# Patient Record
Sex: FEMALE | Race: BLACK OR AFRICAN AMERICAN | NOT HISPANIC OR LATINO | Employment: UNEMPLOYED | URBAN - METROPOLITAN AREA
[De-identification: names, ages, dates, MRNs, and addresses within clinical notes are randomized per-mention and may not be internally consistent; named-entity substitution may affect disease eponyms.]

---

## 2023-10-24 PROBLEM — E66.811 CLASS 1 OBESITY: Status: ACTIVE | Noted: 2023-10-24

## 2023-10-24 PROBLEM — E66.9 CLASS 1 OBESITY: Status: ACTIVE | Noted: 2023-10-24

## 2023-10-24 PROBLEM — O92.70 UNSPECIFIED DISORDERS OF LACTATION (HHS-HCC): Status: ACTIVE | Noted: 2023-10-24

## 2023-10-24 PROBLEM — L20.82 FLEXURAL ECZEMA: Status: ACTIVE | Noted: 2018-02-16

## 2023-10-24 PROBLEM — K21.9 GERD (GASTROESOPHAGEAL REFLUX DISEASE): Status: ACTIVE | Noted: 2023-10-24

## 2023-10-24 RX ORDER — IBUPROFEN 600 MG/1
600 TABLET ORAL EVERY 6 HOURS
COMMUNITY
Start: 2021-12-16 | End: 2023-10-27 | Stop reason: ALTCHOICE

## 2023-10-24 RX ORDER — TRIAMCINOLONE ACETONIDE 1 MG/G
1 CREAM TOPICAL
COMMUNITY
Start: 2018-02-16 | End: 2023-10-27 | Stop reason: ALTCHOICE

## 2023-10-24 RX ORDER — HYDROCORTISONE 25 MG/G
1 CREAM TOPICAL
COMMUNITY
Start: 2018-02-16 | End: 2023-10-27 | Stop reason: ALTCHOICE

## 2023-10-24 RX ORDER — ALBUTEROL SULFATE 90 UG/1
AEROSOL, METERED RESPIRATORY (INHALATION)
COMMUNITY
End: 2023-10-27 | Stop reason: ALTCHOICE

## 2023-10-24 RX ORDER — ACETAMINOPHEN 325 MG/1
TABLET ORAL
COMMUNITY
Start: 2021-12-16 | End: 2023-10-27 | Stop reason: ALTCHOICE

## 2023-10-24 RX ORDER — DOCUSATE SODIUM 100 MG/1
1 CAPSULE, LIQUID FILLED ORAL 2 TIMES DAILY PRN
COMMUNITY
Start: 2021-12-16 | End: 2023-10-27 | Stop reason: ALTCHOICE

## 2023-10-26 ENCOUNTER — APPOINTMENT (OUTPATIENT)
Dept: OBSTETRICS AND GYNECOLOGY | Facility: CLINIC | Age: 33
End: 2023-10-26
Payer: COMMERCIAL

## 2023-10-27 ENCOUNTER — INITIAL PRENATAL (OUTPATIENT)
Dept: OBSTETRICS AND GYNECOLOGY | Facility: CLINIC | Age: 33
End: 2023-10-27
Payer: COMMERCIAL

## 2023-10-27 VITALS
DIASTOLIC BLOOD PRESSURE: 80 MMHG | BODY MASS INDEX: 33.12 KG/M2 | WEIGHT: 198.8 LBS | SYSTOLIC BLOOD PRESSURE: 116 MMHG | HEIGHT: 65 IN

## 2023-10-27 DIAGNOSIS — Z3A.09 9 WEEKS GESTATION OF PREGNANCY (HHS-HCC): ICD-10-CM

## 2023-10-27 PROCEDURE — 87591 N.GONORRHOEAE DNA AMP PROB: CPT | Performed by: STUDENT IN AN ORGANIZED HEALTH CARE EDUCATION/TRAINING PROGRAM

## 2023-10-27 PROCEDURE — 87186 SC STD MICRODIL/AGAR DIL: CPT | Performed by: STUDENT IN AN ORGANIZED HEALTH CARE EDUCATION/TRAINING PROGRAM

## 2023-10-27 PROCEDURE — 87491 CHLMYD TRACH DNA AMP PROBE: CPT | Performed by: STUDENT IN AN ORGANIZED HEALTH CARE EDUCATION/TRAINING PROGRAM

## 2023-10-27 PROCEDURE — 99213 OFFICE O/P EST LOW 20 MIN: CPT | Mod: GC | Performed by: STUDENT IN AN ORGANIZED HEALTH CARE EDUCATION/TRAINING PROGRAM

## 2023-10-27 PROCEDURE — 99213 OFFICE O/P EST LOW 20 MIN: CPT | Performed by: STUDENT IN AN ORGANIZED HEALTH CARE EDUCATION/TRAINING PROGRAM

## 2023-10-27 ASSESSMENT — EDINBURGH POSTNATAL DEPRESSION SCALE (EPDS)
THINGS HAVE BEEN GETTING ON TOP OF ME: NO, I HAVE BEEN COPING AS WELL AS EVER
THE THOUGHT OF HARMING MYSELF HAS OCCURRED TO ME: NEVER
I HAVE LOOKED FORWARD WITH ENJOYMENT TO THINGS: AS MUCH AS I EVER DID
I HAVE BLAMED MYSELF UNNECESSARILY WHEN THINGS WENT WRONG: NO, NEVER
TOTAL SCORE: 0
I HAVE FELT SCARED OR PANICKY FOR NO GOOD REASON: NO, NOT AT ALL
I HAVE BEEN ABLE TO LAUGH AND SEE THE FUNNY SIDE OF THINGS: AS MUCH AS I ALWAYS COULD
I HAVE FELT SAD OR MISERABLE: NO, NOT AT ALL
I HAVE BEEN SO UNHAPPY THAT I HAVE BEEN CRYING: NO, NEVER
I HAVE BEEN ANXIOUS OR WORRIED FOR NO GOOD REASON: NO, NOT AT ALL
I HAVE BEEN SO UNHAPPY THAT I HAVE HAD DIFFICULTY SLEEPING: NOT AT ALL

## 2023-10-27 NOTE — LETTER
10/27/2023    To Whom It May Concern:    This is to certify that my patient,Jing Menjivar is under my care for her pregnancy.    The following guidelines may be used for any dental work:  You may use a local anesthetic with or without Epinephrine.  You may prescribe any Penicillin or Cephalosporin if an antibiotic is necessary. (Dependent on allergy history)  You may take x-rays with a lead apron if necessary.  You may prescribe Tylenol and/or narcotics for pain.  You may extract teeth if necessary.    If you need further information or if you have any concerns, please contact our office.    Sincerely,        Javier Liu MD   [facility] [place]

## 2023-10-27 NOTE — LETTER
10/27/2023    To Whom It May Concern:    iJng Menjivar is being followed for her pregnancy at [facility]. She is 9 weeks 0 days pregnant.  Estimated Date of Delivery: 5/31/24    Sincerely,        Javier Liu MD  [facility]

## 2023-10-27 NOTE — PROGRESS NOTES
Subjective   Patient ID [id number]   Jing Menjivar is a 32 y.o.  at 9w0d with a working estimated date of delivery of 2024, by Last Menstrual Period who presents for an initial prenatal visit. This pregnancy is unplanned and desired.    Her pregnancy is complicated by:  No known pregnancy complications    OB History    Para Term  AB Living   6 3 3 0 2 3   SAB IAB Ectopic Multiple Live Births     2 0 0 3      # Outcome Date GA Lbr Gustavo/2nd Weight Sex Delivery Anes PTL Lv   6 Current            5 Term            4 Term            3 Term            2 IAB            1 IAB              [place]  Depression Scale Total: 0    Objective   Physical Exam  Weight: 90.2 kg (198 lb 12.8 oz)  Expected Total Weight Gain: Could not be calculated   Pregravid BMI: Could not be calculated  BP: 116/80 (hr 73)          Physical Exam  HENT:      Head: Normocephalic.      Nose: Nose normal.   Eyes:      Extraocular Movements: Extraocular movements intact.      Conjunctiva/sclera: Conjunctivae normal.   Cardiovascular:      Comments: WWP  Pulmonary:      Effort: Pulmonary effort is normal.   Abdominal:      Palpations: Abdomen is soft.   Musculoskeletal:         General: Normal range of motion.      Cervical back: Normal range of motion.   Neurological:      Mental Status: She is alert.   Skin:     General: Skin is warm.   Vitals and nursing note reviewed. Exam conducted with a chaperone present.     BSUS with confirmed IUP and visible FHR.    Prenatal Labs  1st trimester lab panel sent as below/.    Assessment/Plan   Problem List Items Addressed This Visit    None  Visit Diagnoses         Codes    9 weeks gestation of pregnancy     Z3A.09    Relevant Orders    Hemoglobin    Hematocrit    ABO/Rh    Neisseria gonorrhoeae, Amplified    Chlamydia, PCR    Hepatitis B surface Ag    HIV-1 and HIV-2 antibodies    Urine culture    Urinalysis with Reflex Microscopic    POC Urine Dip    Rubella IgG    US MAC OB  imaging order    SMA Carrier Screening    CF carrier screen            Immunizations: None at today's visit  Prenatal Labs ordered  Daily prenatal vitamins prescribed  First trimester screening and second trimester screening discussed. Patient decided to Proceed with screening and labs today.   For first trimester US in 1-2 weeks for formal dating.  Follow up in 4 weeks for return OB visit.    D/w Dr. Kate Liu MD PGY-3

## 2023-10-28 LAB
C TRACH RRNA SPEC QL NAA+PROBE: NEGATIVE
N GONORRHOEA DNA SPEC QL PROBE+SIG AMP: NEGATIVE

## 2023-10-30 ENCOUNTER — TELEPHONE (OUTPATIENT)
Dept: OBSTETRICS AND GYNECOLOGY | Facility: HOSPITAL | Age: 33
End: 2023-10-30
Payer: COMMERCIAL

## 2023-10-30 DIAGNOSIS — R82.71 BACTERIURIA, ASYMPTOMATIC IN PREGNANCY (HHS-HCC): Primary | ICD-10-CM

## 2023-10-30 DIAGNOSIS — O99.891 BACTERIURIA, ASYMPTOMATIC IN PREGNANCY (HHS-HCC): Primary | ICD-10-CM

## 2023-10-30 LAB — BACTERIA UR CULT: ABNORMAL

## 2023-10-30 RX ORDER — AMOXICILLIN 875 MG/1
875 TABLET, FILM COATED ORAL EVERY 12 HOURS SCHEDULED
Status: SHIPPED | OUTPATIENT
Start: 2023-10-30 | End: 2023-11-04

## 2023-10-31 ENCOUNTER — ANCILLARY PROCEDURE (OUTPATIENT)
Dept: RADIOLOGY | Facility: CLINIC | Age: 33
End: 2023-10-31
Payer: COMMERCIAL

## 2023-10-31 DIAGNOSIS — Z3A.09 9 WEEKS GESTATION OF PREGNANCY (HHS-HCC): ICD-10-CM

## 2023-10-31 PROCEDURE — 76801 OB US < 14 WKS SINGLE FETUS: CPT

## 2023-10-31 PROCEDURE — 76801 OB US < 14 WKS SINGLE FETUS: CPT | Performed by: OBSTETRICS & GYNECOLOGY

## 2023-11-11 ENCOUNTER — APPOINTMENT (OUTPATIENT)
Dept: PRIMARY CARE | Facility: CLINIC | Age: 33
End: 2023-11-11
Payer: COMMERCIAL

## 2023-11-20 ENCOUNTER — SOCIAL WORK (OUTPATIENT)
Dept: PEDIATRICS | Facility: CLINIC | Age: 33
End: 2023-11-20
Payer: COMMERCIAL

## 2023-11-20 ENCOUNTER — ROUTINE PRENATAL (OUTPATIENT)
Dept: OBSTETRICS AND GYNECOLOGY | Facility: CLINIC | Age: 33
End: 2023-11-20
Payer: COMMERCIAL

## 2023-11-20 ENCOUNTER — PHARMACY VISIT (OUTPATIENT)
Dept: PHARMACY | Facility: CLINIC | Age: 33
End: 2023-11-20
Payer: MEDICAID

## 2023-11-20 ENCOUNTER — LAB (OUTPATIENT)
Dept: LAB | Facility: LAB | Age: 33
End: 2023-11-20
Payer: COMMERCIAL

## 2023-11-20 VITALS — SYSTOLIC BLOOD PRESSURE: 112 MMHG | DIASTOLIC BLOOD PRESSURE: 75 MMHG | BODY MASS INDEX: 32.42 KG/M2 | WEIGHT: 194.8 LBS

## 2023-11-20 DIAGNOSIS — O21.9 NAUSEA AND VOMITING IN PREGNANCY PRIOR TO 22 WEEKS GESTATION (HHS-HCC): Primary | ICD-10-CM

## 2023-11-20 DIAGNOSIS — O21.9 NAUSEA AND VOMITING IN PREGNANCY PRIOR TO 22 WEEKS GESTATION (HHS-HCC): ICD-10-CM

## 2023-11-20 DIAGNOSIS — F32.A DEPRESSION AFFECTING PREGNANCY IN FIRST TRIMESTER, ANTEPARTUM (HHS-HCC): ICD-10-CM

## 2023-11-20 DIAGNOSIS — O99.341 DEPRESSION AFFECTING PREGNANCY IN FIRST TRIMESTER, ANTEPARTUM (HHS-HCC): Primary | ICD-10-CM

## 2023-11-20 DIAGNOSIS — J30.2 SEASONAL ALLERGIES: ICD-10-CM

## 2023-11-20 DIAGNOSIS — O99.341 DEPRESSION AFFECTING PREGNANCY IN FIRST TRIMESTER, ANTEPARTUM (HHS-HCC): ICD-10-CM

## 2023-11-20 DIAGNOSIS — Z3A.09 9 WEEKS GESTATION OF PREGNANCY (HHS-HCC): ICD-10-CM

## 2023-11-20 DIAGNOSIS — O09.91 ENCOUNTER FOR SUPERVISION OF HIGH RISK PREGNANCY IN FIRST TRIMESTER, ANTEPARTUM (HHS-HCC): ICD-10-CM

## 2023-11-20 DIAGNOSIS — Z3A.12 12 WEEKS GESTATION OF PREGNANCY (HHS-HCC): ICD-10-CM

## 2023-11-20 DIAGNOSIS — F32.A DEPRESSION AFFECTING PREGNANCY IN FIRST TRIMESTER, ANTEPARTUM (HHS-HCC): Primary | ICD-10-CM

## 2023-11-20 LAB
ABO GROUP (TYPE) IN BLOOD: NORMAL
HBV SURFACE AG SERPL QL IA: NONREACTIVE
HCT VFR BLD AUTO: 38.7 % (ref 36–46)
HGB BLD-MCNC: 12.8 G/DL (ref 12–16)
HIV 1+2 AB+HIV1 P24 AG SERPL QL IA: NONREACTIVE
RH FACTOR (ANTIGEN D): NORMAL
RUBV IGG SERPL IA-ACNC: 2.9 IA
RUBV IGG SERPL QL IA: POSITIVE
TSH SERPL-ACNC: 0.7 MIU/L (ref 0.44–3.98)

## 2023-11-20 PROCEDURE — 86900 BLOOD TYPING SEROLOGIC ABO: CPT

## 2023-11-20 PROCEDURE — 86901 BLOOD TYPING SEROLOGIC RH(D): CPT

## 2023-11-20 PROCEDURE — 81329 SMN1 GENE DOS/DELETION ALYS: CPT

## 2023-11-20 PROCEDURE — 85014 HEMATOCRIT: CPT

## 2023-11-20 PROCEDURE — RXMED WILLOW AMBULATORY MEDICATION CHARGE

## 2023-11-20 PROCEDURE — 85018 HEMOGLOBIN: CPT

## 2023-11-20 PROCEDURE — 84443 ASSAY THYROID STIM HORMONE: CPT

## 2023-11-20 PROCEDURE — 87389 HIV-1 AG W/HIV-1&-2 AB AG IA: CPT

## 2023-11-20 PROCEDURE — 86317 IMMUNOASSAY INFECTIOUS AGENT: CPT

## 2023-11-20 PROCEDURE — 36415 COLL VENOUS BLD VENIPUNCTURE: CPT

## 2023-11-20 PROCEDURE — 99213 OFFICE O/P EST LOW 20 MIN: CPT

## 2023-11-20 PROCEDURE — 81220 CFTR GENE COM VARIANTS: CPT

## 2023-11-20 PROCEDURE — 87340 HEPATITIS B SURFACE AG IA: CPT

## 2023-11-20 PROCEDURE — 99213 OFFICE O/P EST LOW 20 MIN: CPT | Mod: GC,TH

## 2023-11-20 RX ORDER — PYRIDOXINE HCL (VITAMIN B6) 25 MG
25 TABLET ORAL NIGHTLY
Qty: 30 TABLET | Refills: 1 | Status: SHIPPED | OUTPATIENT
Start: 2023-11-20 | End: 2023-12-30

## 2023-11-20 RX ORDER — LORATADINE 10 MG
10 TABLET,DISINTEGRATING ORAL DAILY
Qty: 30 TABLET | Refills: 11 | Status: SHIPPED | OUTPATIENT
Start: 2023-11-20 | End: 2023-11-20 | Stop reason: ALTCHOICE

## 2023-11-20 RX ORDER — DOXYLAMINE SUCCINATE AND PYRIDOXINE HYDROCHLORIDE, DELAYED RELEASE TABLETS 10 MG/10 MG 10; 10 MG/1; MG/1
2 TABLET, DELAYED RELEASE ORAL NIGHTLY
Qty: 60 TABLET | Refills: 1 | Status: SHIPPED | OUTPATIENT
Start: 2023-11-20 | End: 2023-12-20

## 2023-11-20 RX ORDER — LORATADINE 10 MG/1
10 TABLET ORAL DAILY
Qty: 30 TABLET | Refills: 11 | Status: SHIPPED | OUTPATIENT
Start: 2023-11-20 | End: 2024-11-19

## 2023-11-20 ASSESSMENT — ENCOUNTER SYMPTOMS
CARDIOVASCULAR NEGATIVE: 0
PSYCHIATRIC NEGATIVE: 0
HEMATOLOGIC/LYMPHATIC NEGATIVE: 0
ENDOCRINE NEGATIVE: 0
NEUROLOGICAL NEGATIVE: 0
ALLERGIC/IMMUNOLOGIC NEGATIVE: 0
EYES NEGATIVE: 0
RESPIRATORY NEGATIVE: 0
GASTROINTESTINAL NEGATIVE: 0
MUSCULOSKELETAL NEGATIVE: 0
CONSTITUTIONAL NEGATIVE: 0

## 2023-11-20 NOTE — ASSESSMENT & PLAN NOTE
- diffuse enlarged thyroid on exam, TSH with reflex to T4 ordered  - Referral to Dr. Alvarez placed  - Social work saw patient today, referral for community therapy placed

## 2023-11-20 NOTE — PROGRESS NOTES
"Subjective   Patient ID [id number]   Jing Menjivar is a 33 y.o.  at 12w3d with a working estimated date of delivery of 2024, by Last Menstrual Period who presents for a routine prenatal visit. Pt c/o sadness, hoplesness and passive thoughts of harming self. Previously had depression in prior pregnancies and feels similar symptoms. Was on Wellbutrin and recently lost follow up with therapist.     Pt continues to have nausea.      She denies vaginal bleeding, leakage of fluid, and contractions.    Her pregnancy is complicated by:  Depression, previously on Wellbutrin prior to current pregnancy      Objective   Physical Exam  Weight: 88.4 kg (194 lb 12.8 oz), Pregravid BMI: Could not be calculated  Expected Total Weight Gain: Could not be calculated   BP: 112/75  Fetal Heart Rate: 152      General: alert and awake   HEENT: enlarged thyroid, non-tender   Abodmen: soft, gravid, non-tender     Prenatal Labs  Urine dip:  Lab Results   Component Value Date    KETONESU NEGATIVE 2020     Lab Results   Component Value Date    HGB 11.7 (L) 2023    HCT 35.9 (L) 2023    ABO AB 2023    HEPBSAG NONREACTIVE 2021     No results found for: \"PAPPA\", \"AFP\", \"HCG\", \"ESTRIOL\", \"INHBA\"    Imaging  The most recent ultrasound was performed on The most recent ultrasound study is not finalized with a study GA of The most recent ultrasound study is not finalized.  The most recent ultrasound study is not finalized  The most recent ultrasound study is not finalized    Assessment/Plan   Problem List Items Addressed This Visit       Depression affecting pregnancy in first trimester, antepartum - Primary    Overview     H/o depression in previous pregnancies, previously on Wellbutrin and followed with therapist          Current Assessment & Plan     - diffuse enlarged thyroid on exam, TSH with reflex to T4 ordered  - Referral to Dr. Antonio rodrigues  - Social work saw patient today, referral for community " therapy placed          Relevant Orders    Referral to Psychiatry    TSH with reflex to Free T4 if abnormal    Encounter for supervision of high risk pregnancy in first trimester, antepartum    Current Assessment & Plan     -PNLs to be collected today  -NT US scheduled for next visit            Relevant Orders    US OB NT (NUCHAL translucency)     Other Visit Diagnoses       Seasonal allergies        Relevant Medications    loratadine (Claritin RediTabs) 10 mg disintegrating tablet    Nausea and vomiting in pregnancy prior to 22 weeks gestation        Relevant Medications    doxylamine-pyridoxine, vit B6, 10-10 mg tablet,delayed release (DR/EC)          Follow up in 4 weeks for a routine prenatal visit.    D/w Dr. Breen,  Jane Starkey MD, PGY-2

## 2023-11-20 NOTE — PROGRESS NOTES
Social Work Note:     Jing Menjivar is a 33 y.o. female who presents for the following:     Patient Name:  Jing Menjivar  Medical Record Number:  [id number]  YOB: 1990    Date Seen:  11/20/2023    SW received referral from Women's health staff due to counseling referral for pt. SW met with pt, Jing Menjivar ([phone]), introduced self and explained reason for visit. Pt explained she has a history of depression and anxiety, and symptoms increase when she is pregnant, as noted in previous pregnancies. Pt shared she has noticed worsening depression and passive SI since becoming pregnant. Pt due 5/31/24. Pt lost contact with [facility] therapist a few months ago. Referral made by physician to Dr. Alvarez for psychiatry. SW discussed options for counseling referral and obtained verbal consent to refer pt to [facility]. Pt denied active SI/HI today. No plan. SW provided list of resources for baby supplies, food, WIC, and transportation assistance. No further SW needs at this time. SW contact info provided if needs arise.

## 2023-11-24 ENCOUNTER — APPOINTMENT (OUTPATIENT)
Dept: OBSTETRICS AND GYNECOLOGY | Facility: CLINIC | Age: 33
End: 2023-11-24
Payer: COMMERCIAL

## 2023-11-27 ENCOUNTER — APPOINTMENT (OUTPATIENT)
Dept: RADIOLOGY | Facility: CLINIC | Age: 33
End: 2023-11-27
Payer: COMMERCIAL

## 2023-11-30 LAB
ELECTRONICALLY SIGNED BY: NORMAL
SMA RESULT: NORMAL

## 2023-12-01 LAB
CFTR MUT ANL BLD/T: NORMAL
ELECTRONICALLY SIGNED BY: NORMAL

## 2023-12-08 ENCOUNTER — PROCEDURE VISIT (OUTPATIENT)
Dept: RADIOLOGY | Facility: CLINIC | Age: 33
End: 2023-12-08
Payer: COMMERCIAL

## 2023-12-08 DIAGNOSIS — O09.91 ENCOUNTER FOR SUPERVISION OF HIGH RISK PREGNANCY IN FIRST TRIMESTER, ANTEPARTUM (HHS-HCC): ICD-10-CM

## 2023-12-08 PROCEDURE — 76815 OB US LIMITED FETUS(S): CPT | Performed by: OBSTETRICS & GYNECOLOGY

## 2023-12-08 PROCEDURE — 76815 OB US LIMITED FETUS(S): CPT

## 2023-12-14 NOTE — PROGRESS NOTES
I saw and evaluated the patient. I personally obtained the key and critical portions of the history and physical exam or was physically present for key and critical portions performed by the resident/fellow. I reviewed the resident/fellow's documentation and discussed the patient with the resident/fellow. I agree with the resident/fellow's medical decision making as documented in the note.    Carie Love MD

## 2024-02-09 ENCOUNTER — INITIAL PRENATAL (OUTPATIENT)
Dept: OBSTETRICS AND GYNECOLOGY | Facility: CLINIC | Age: 34
End: 2024-02-09
Payer: COMMERCIAL

## 2024-02-09 VITALS — DIASTOLIC BLOOD PRESSURE: 73 MMHG | BODY MASS INDEX: 34.05 KG/M2 | SYSTOLIC BLOOD PRESSURE: 115 MMHG | WEIGHT: 204.6 LBS

## 2024-02-09 DIAGNOSIS — R51.9 PREGNANCY HEADACHE IN SECOND TRIMESTER (HHS-HCC): ICD-10-CM

## 2024-02-09 DIAGNOSIS — O99.342 DEPRESSION AFFECTING PREGNANCY IN SECOND TRIMESTER, ANTEPARTUM (HHS-HCC): ICD-10-CM

## 2024-02-09 DIAGNOSIS — F32.A DEPRESSION AFFECTING PREGNANCY IN SECOND TRIMESTER, ANTEPARTUM (HHS-HCC): ICD-10-CM

## 2024-02-09 DIAGNOSIS — O26.892 PREGNANCY HEADACHE IN SECOND TRIMESTER (HHS-HCC): ICD-10-CM

## 2024-02-09 DIAGNOSIS — Z3A.24 24 WEEKS GESTATION OF PREGNANCY (HHS-HCC): Primary | ICD-10-CM

## 2024-02-09 PROBLEM — J45.40 MODERATE PERSISTENT ASTHMA WITHOUT COMPLICATION (HHS-HCC): Status: ACTIVE | Noted: 2024-02-09

## 2024-02-09 PROCEDURE — RXMED WILLOW AMBULATORY MEDICATION CHARGE

## 2024-02-09 PROCEDURE — 99214 OFFICE O/P EST MOD 30 MIN: CPT | Performed by: ADVANCED PRACTICE MIDWIFE

## 2024-02-09 RX ORDER — SERTRALINE HYDROCHLORIDE 25 MG/1
50 TABLET, FILM COATED ORAL DAILY
Qty: 60 TABLET | Refills: 3 | Status: SHIPPED | OUTPATIENT
Start: 2024-02-09 | End: 2024-03-15

## 2024-02-09 RX ORDER — METOCLOPRAMIDE 10 MG/1
10 TABLET ORAL EVERY 8 HOURS PRN
Qty: 14 TABLET | Refills: 1 | Status: SHIPPED | OUTPATIENT
Start: 2024-02-09 | End: 2024-05-03

## 2024-02-09 NOTE — PROGRESS NOTES
Assessment/Plan   Problem List Items Addressed This Visit             ICD-10-CM    Depression affecting pregnancy in second trimester, antepartum O99.342, F32.A    Relevant Medications    sertraline (Zoloft) 25 mg tablet     Other Visit Diagnoses         Codes    24 weeks gestation of pregnancy    -  Primary Z3A.24    Relevant Medications    metoclopramide (Reglan) 10 mg tablet    sertraline (Zoloft) 25 mg tablet    Other Relevant Orders    Urine Culture    US MAC OB imaging order    Hemoglobin Identification with Path Review    Pregnancy headache in second trimester     O26.892, R51.9    Relevant Medications    metoclopramide (Reglan) 10 mg tablet            Labs reviewed.  Discussed diabetes screening and routine labs, to be completed next visit  Benefits of breastfeeding discussed with patient, breast pump ordered   IOP referral  Discussed headaches with patient-   SW to come talk today to patient for depression in preg    Reviewed s/sx of PTL, warning signs, fetal movement counts, and when to call provider  Follow up in 2 weeks for a routine prenatal visit.    MAGNOLIA Oneillamar Menjivar is a 33 y.o.  at 24w0d with a working estimated date of delivery of 2024, by Last Menstrual Period who presents for a routine prenatal visit. She denies vaginal bleeding, leakage of fluid, decreased fetal movements, or contractions.    Her pregnancy is complicated by:  Pregnancy Problems (from 10/27/23 to present)       Problem Noted Resolved    Moderate persistent asthma without complication 2024 by MAGNOLIA Oneil No    Priority:  Medium      Encounter for supervision of high risk pregnancy in first trimester, antepartum 2023 by Jane Starkey MD No    Priority:  Medium      Depression affecting pregnancy in first trimester, antepartum 2023 by Jane Starkey MD No    Priority:  Medium      Overview Addendum 2023 11:18 AM by  Jane Starkey MD     H/o depression in previous pregnancies, previously on Wellbutrin and followed with therapist                   Objective   Physical Exam  Weight: 92.8 kg (204 lb 9.6 oz)  Expected Total Weight Gain: 5 kg (11 lb)-9 kg (19 lb)   Pregravid BMI: 31.62  BP: 115/73 (HR 76)  Fetal Heart Rate: 148 Fundal Height (cm): 24 cm    Postpartum Depression: Low Risk  (10/27/2023)    [place]  Depression Scale     Last EPDS Total Score: 0     Last EPDS Self Harm Result: Never       Prenatal Labs  Lab Results   Component Value Date    HGB 12.8 2023    HCT 38.7 2023    ABO AB 2023    HEPBSAG Nonreactive 2023     Glucose, 1 Hr Screen, Preg   Date Value Ref Range Status   2021 109 <135 mg/dL Final     Comment:      Diagnostic value with glucose loading dose of 50 g.   Reference values from American Diabetes Association.   Diabetes Care 2015;38(Suppl.1):S8-S16          Imaging  The most recent ultrasound was performed on The most recent ultrasound study is not finalized with a study GA of The most recent ultrasound study is not finalized and EFW of The most recent ultrasound study is not finalized.  The most recent ultrasound study is not finalized  The most recent ultrasound study is not finalized

## 2024-02-14 ENCOUNTER — PHARMACY VISIT (OUTPATIENT)
Dept: PHARMACY | Facility: CLINIC | Age: 34
End: 2024-02-14
Payer: MEDICAID

## 2024-02-15 ENCOUNTER — HOSPITAL ENCOUNTER (OUTPATIENT)
Dept: RADIOLOGY | Facility: CLINIC | Age: 34
Discharge: HOME | End: 2024-02-15
Payer: COMMERCIAL

## 2024-02-15 DIAGNOSIS — Z3A.24 24 WEEKS GESTATION OF PREGNANCY (HHS-HCC): ICD-10-CM

## 2024-02-15 PROCEDURE — 76811 OB US DETAILED SNGL FETUS: CPT

## 2024-02-15 PROCEDURE — 76811 OB US DETAILED SNGL FETUS: CPT | Performed by: MEDICAL GENETICS

## 2024-02-22 PROBLEM — R68.89 SMALL HEAD CIRCUMFERENCE: Status: ACTIVE | Noted: 2024-02-22

## 2024-03-07 ENCOUNTER — HOSPITAL ENCOUNTER (OUTPATIENT)
Dept: RADIOLOGY | Facility: CLINIC | Age: 34
Discharge: HOME | End: 2024-03-07
Payer: COMMERCIAL

## 2024-03-07 DIAGNOSIS — Z3A.24 24 WEEKS GESTATION OF PREGNANCY (HHS-HCC): ICD-10-CM

## 2024-03-07 PROCEDURE — 76816 OB US FOLLOW-UP PER FETUS: CPT

## 2024-03-07 PROCEDURE — 76816 OB US FOLLOW-UP PER FETUS: CPT | Performed by: OBSTETRICS & GYNECOLOGY

## 2024-04-24 ENCOUNTER — HOSPITAL ENCOUNTER (OUTPATIENT)
Facility: HOSPITAL | Age: 34
Discharge: HOME | End: 2024-04-25
Attending: STUDENT IN AN ORGANIZED HEALTH CARE EDUCATION/TRAINING PROGRAM | Admitting: STUDENT IN AN ORGANIZED HEALTH CARE EDUCATION/TRAINING PROGRAM
Payer: COMMERCIAL
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
## 2024-04-24 DIAGNOSIS — R10.2 PELVIC PAIN AFFECTING PREGNANCY IN THIRD TRIMESTER, ANTEPARTUM (HHS-HCC): ICD-10-CM

## 2024-04-24 DIAGNOSIS — O26.893 PELVIC PAIN AFFECTING PREGNANCY IN THIRD TRIMESTER, ANTEPARTUM (HHS-HCC): ICD-10-CM

## 2024-04-24 LAB
ALBUMIN SERPL BCP-MCNC: 3.2 G/DL (ref 3.4–5)
ALP SERPL-CCNC: 83 U/L (ref 33–110)
ALT SERPL W P-5'-P-CCNC: 8 U/L (ref 7–45)
ANION GAP SERPL CALC-SCNC: 13 MMOL/L (ref 10–20)
APTT PPP: 26 SECONDS (ref 27–38)
AST SERPL W P-5'-P-CCNC: 9 U/L (ref 9–39)
BILIRUB SERPL-MCNC: 0.2 MG/DL (ref 0–1.2)
BUN SERPL-MCNC: 6 MG/DL (ref 6–23)
CALCIUM SERPL-MCNC: 8.5 MG/DL (ref 8.6–10.6)
CHLORIDE SERPL-SCNC: 105 MMOL/L (ref 98–107)
CO2 SERPL-SCNC: 21 MMOL/L (ref 21–32)
CREAT SERPL-MCNC: 0.41 MG/DL (ref 0.5–1.05)
EGFRCR SERPLBLD CKD-EPI 2021: >90 ML/MIN/1.73M*2
ERYTHROCYTE [DISTWIDTH] IN BLOOD BY AUTOMATED COUNT: 15.5 % (ref 11.5–14.5)
FIBRINOGEN PPP-MCNC: 425 MG/DL (ref 200–400)
GLUCOSE BLD MANUAL STRIP-MCNC: 112 MG/DL (ref 74–99)
GLUCOSE SERPL-MCNC: 91 MG/DL (ref 74–99)
HCT VFR BLD AUTO: 30.1 % (ref 36–46)
HGB BLD-MCNC: 9.5 G/DL (ref 12–16)
INR PPP: 0.9 (ref 0.9–1.1)
MCH RBC QN AUTO: 25.3 PG (ref 26–34)
MCHC RBC AUTO-ENTMCNC: 31.6 G/DL (ref 32–36)
MCV RBC AUTO: 80 FL (ref 80–100)
NRBC BLD-RTO: 0 /100 WBCS (ref 0–0)
PLATELET # BLD AUTO: 227 X10*3/UL (ref 150–450)
POTASSIUM SERPL-SCNC: 3.7 MMOL/L (ref 3.5–5.3)
PROT SERPL-MCNC: 6.5 G/DL (ref 6.4–8.2)
PROTHROMBIN TIME: 10.5 SECONDS (ref 9.8–12.8)
RBC # BLD AUTO: 3.76 X10*6/UL (ref 4–5.2)
SODIUM SERPL-SCNC: 135 MMOL/L (ref 136–145)
WBC # BLD AUTO: 15.8 X10*3/UL (ref 4.4–11.3)

## 2024-04-24 PROCEDURE — 83036 HEMOGLOBIN GLYCOSYLATED A1C: CPT | Performed by: OBSTETRICS & GYNECOLOGY

## 2024-04-24 PROCEDURE — 86780 TREPONEMA PALLIDUM: CPT | Performed by: OBSTETRICS & GYNECOLOGY

## 2024-04-24 PROCEDURE — 2500000001 HC RX 250 WO HCPCS SELF ADMINISTERED DRUGS (ALT 637 FOR MEDICARE OP): Performed by: STUDENT IN AN ORGANIZED HEALTH CARE EDUCATION/TRAINING PROGRAM

## 2024-04-24 PROCEDURE — 99214 OFFICE O/P EST MOD 30 MIN: CPT | Performed by: STUDENT IN AN ORGANIZED HEALTH CARE EDUCATION/TRAINING PROGRAM

## 2024-04-24 PROCEDURE — 85610 PROTHROMBIN TIME: CPT | Performed by: STUDENT IN AN ORGANIZED HEALTH CARE EDUCATION/TRAINING PROGRAM

## 2024-04-24 PROCEDURE — 80053 COMPREHEN METABOLIC PANEL: CPT | Performed by: STUDENT IN AN ORGANIZED HEALTH CARE EDUCATION/TRAINING PROGRAM

## 2024-04-24 PROCEDURE — 82728 ASSAY OF FERRITIN: CPT | Performed by: STUDENT IN AN ORGANIZED HEALTH CARE EDUCATION/TRAINING PROGRAM

## 2024-04-24 PROCEDURE — 85384 FIBRINOGEN ACTIVITY: CPT | Performed by: STUDENT IN AN ORGANIZED HEALTH CARE EDUCATION/TRAINING PROGRAM

## 2024-04-24 PROCEDURE — 82947 ASSAY GLUCOSE BLOOD QUANT: CPT

## 2024-04-24 PROCEDURE — 36415 COLL VENOUS BLD VENIPUNCTURE: CPT | Performed by: STUDENT IN AN ORGANIZED HEALTH CARE EDUCATION/TRAINING PROGRAM

## 2024-04-24 PROCEDURE — 83540 ASSAY OF IRON: CPT | Performed by: STUDENT IN AN ORGANIZED HEALTH CARE EDUCATION/TRAINING PROGRAM

## 2024-04-24 PROCEDURE — 59025 FETAL NON-STRESS TEST: CPT | Performed by: STUDENT IN AN ORGANIZED HEALTH CARE EDUCATION/TRAINING PROGRAM

## 2024-04-24 PROCEDURE — 86803 HEPATITIS C AB TEST: CPT | Performed by: OBSTETRICS & GYNECOLOGY

## 2024-04-24 PROCEDURE — 85027 COMPLETE CBC AUTOMATED: CPT | Performed by: STUDENT IN AN ORGANIZED HEALTH CARE EDUCATION/TRAINING PROGRAM

## 2024-04-24 RX ORDER — ACETAMINOPHEN 325 MG/1
975 TABLET ORAL ONCE
Status: COMPLETED | OUTPATIENT
Start: 2024-04-24 | End: 2024-04-24

## 2024-04-24 RX ORDER — LABETALOL HYDROCHLORIDE 5 MG/ML
20 INJECTION, SOLUTION INTRAVENOUS ONCE AS NEEDED
Status: DISCONTINUED | OUTPATIENT
Start: 2024-04-24 | End: 2024-04-25 | Stop reason: HOSPADM

## 2024-04-24 RX ORDER — ONDANSETRON HYDROCHLORIDE 2 MG/ML
4 INJECTION, SOLUTION INTRAVENOUS EVERY 6 HOURS PRN
Status: DISCONTINUED | OUTPATIENT
Start: 2024-04-24 | End: 2024-04-25 | Stop reason: HOSPADM

## 2024-04-24 RX ORDER — NIFEDIPINE 10 MG/1
10 CAPSULE ORAL ONCE AS NEEDED
Status: DISCONTINUED | OUTPATIENT
Start: 2024-04-24 | End: 2024-04-25 | Stop reason: HOSPADM

## 2024-04-24 RX ORDER — HYDRALAZINE HYDROCHLORIDE 20 MG/ML
5 INJECTION INTRAMUSCULAR; INTRAVENOUS ONCE AS NEEDED
Status: DISCONTINUED | OUTPATIENT
Start: 2024-04-24 | End: 2024-04-25 | Stop reason: HOSPADM

## 2024-04-24 RX ORDER — LIDOCAINE HYDROCHLORIDE 10 MG/ML
0.5 INJECTION INFILTRATION; PERINEURAL ONCE AS NEEDED
Status: DISCONTINUED | OUTPATIENT
Start: 2024-04-24 | End: 2024-04-25 | Stop reason: HOSPADM

## 2024-04-24 RX ORDER — ONDANSETRON 4 MG/1
4 TABLET, FILM COATED ORAL EVERY 6 HOURS PRN
Status: DISCONTINUED | OUTPATIENT
Start: 2024-04-24 | End: 2024-04-25 | Stop reason: HOSPADM

## 2024-04-24 RX ORDER — CYCLOBENZAPRINE HCL 10 MG
10 TABLET ORAL ONCE
Status: COMPLETED | OUTPATIENT
Start: 2024-04-24 | End: 2024-04-24

## 2024-04-24 RX ORDER — OXYCODONE HYDROCHLORIDE 5 MG/1
10 TABLET ORAL ONCE
Status: COMPLETED | OUTPATIENT
Start: 2024-04-24 | End: 2024-04-24

## 2024-04-24 RX ADMIN — ACETAMINOPHEN 975 MG: 325 TABLET ORAL at 20:45

## 2024-04-24 RX ADMIN — CYCLOBENZAPRINE HYDROCHLORIDE 10 MG: 10 TABLET, FILM COATED ORAL at 20:45

## 2024-04-24 RX ADMIN — OXYCODONE HYDROCHLORIDE 10 MG: 5 TABLET ORAL at 23:16

## 2024-04-24 SDOH — SOCIAL STABILITY: SOCIAL INSECURITY: ARE YOU OR HAVE YOU BEEN THREATENED OR ABUSED PHYSICALLY, EMOTIONALLY, OR SEXUALLY BY ANYONE?: NO

## 2024-04-24 SDOH — SOCIAL STABILITY: SOCIAL INSECURITY: HAS ANYONE EVER THREATENED TO HURT YOUR FAMILY OR YOUR PETS?: NO

## 2024-04-24 SDOH — SOCIAL STABILITY: SOCIAL INSECURITY: DO YOU FEEL ANYONE HAS EXPLOITED OR TAKEN ADVANTAGE OF YOU FINANCIALLY OR OF YOUR PERSONAL PROPERTY?: NO

## 2024-04-24 SDOH — SOCIAL STABILITY: SOCIAL INSECURITY: HAVE YOU HAD ANY THOUGHTS OF HARMING ANYONE ELSE?: NO

## 2024-04-24 SDOH — HEALTH STABILITY: MENTAL HEALTH: WERE YOU ABLE TO COMPLETE ALL THE BEHAVIORAL HEALTH SCREENINGS?: YES

## 2024-04-24 SDOH — ECONOMIC STABILITY: HOUSING INSECURITY: DO YOU FEEL UNSAFE GOING BACK TO THE PLACE WHERE YOU ARE LIVING?: NO

## 2024-04-24 SDOH — HEALTH STABILITY: MENTAL HEALTH: SUICIDAL BEHAVIOR (LIFETIME): NO

## 2024-04-24 SDOH — SOCIAL STABILITY: SOCIAL INSECURITY: VERBAL ABUSE: DENIES

## 2024-04-24 SDOH — SOCIAL STABILITY: SOCIAL INSECURITY: ABUSE SCREEN: ADULT

## 2024-04-24 SDOH — SOCIAL STABILITY: SOCIAL INSECURITY: PHYSICAL ABUSE: DENIES

## 2024-04-24 SDOH — SOCIAL STABILITY: SOCIAL INSECURITY: HAVE YOU HAD THOUGHTS OF HARMING ANYONE ELSE?: NO

## 2024-04-24 SDOH — HEALTH STABILITY: MENTAL HEALTH: NON-SPECIFIC ACTIVE SUICIDAL THOUGHTS (PAST 1 MONTH): NO

## 2024-04-24 SDOH — SOCIAL STABILITY: SOCIAL INSECURITY: DOES ANYONE TRY TO KEEP YOU FROM HAVING/CONTACTING OTHER FRIENDS OR DOING THINGS OUTSIDE YOUR HOME?: NO

## 2024-04-24 SDOH — HEALTH STABILITY: MENTAL HEALTH: STRENGTHS (MUST CHOOSE TWO): SUPPORT FROM ORGANIZED COMMUNITY;STABLE HOUSING;SUPPORT FROM FRIENDS

## 2024-04-24 SDOH — SOCIAL STABILITY: SOCIAL INSECURITY: ARE THERE ANY APPARENT SIGNS OF INJURIES/BEHAVIORS THAT COULD BE RELATED TO ABUSE/NEGLECT?: NO

## 2024-04-24 SDOH — HEALTH STABILITY: MENTAL HEALTH: HAVE YOU USED ANY PRESCRIPTION DRUGS OTHER THAN PRESCRIBED IN THE PAST 12 MONTHS?: NO

## 2024-04-24 SDOH — HEALTH STABILITY: MENTAL HEALTH: HAVE YOU USED ANY SUBSTANCES (CANABIS, COCAINE, HEROIN, HALLUCINOGENS, INHALANTS, ETC.) IN THE PAST 12 MONTHS?: NO

## 2024-04-24 SDOH — HEALTH STABILITY: MENTAL HEALTH: WISH TO BE DEAD (PAST 1 MONTH): NO

## 2024-04-24 ASSESSMENT — LIFESTYLE VARIABLES
AUDIT-C TOTAL SCORE: 0
HOW OFTEN DO YOU HAVE 6 OR MORE DRINKS ON ONE OCCASION: NEVER
HOW OFTEN DO YOU HAVE A DRINK CONTAINING ALCOHOL: NEVER
HOW MANY STANDARD DRINKS CONTAINING ALCOHOL DO YOU HAVE ON A TYPICAL DAY: PATIENT DOES NOT DRINK
AUDIT-C TOTAL SCORE: 0
SKIP TO QUESTIONS 9-10: 1

## 2024-04-24 ASSESSMENT — PATIENT HEALTH QUESTIONNAIRE - PHQ9
SUM OF ALL RESPONSES TO PHQ9 QUESTIONS 1 & 2: 0
1. LITTLE INTEREST OR PLEASURE IN DOING THINGS: NOT AT ALL
2. FEELING DOWN, DEPRESSED OR HOPELESS: NOT AT ALL

## 2024-04-24 ASSESSMENT — PAIN SCALES - GENERAL
PAINLEVEL_OUTOF10: 10 - WORST POSSIBLE PAIN
PAINLEVEL: 10 - WORST POSSIBLE PAIN
PAINLEVEL_OUTOF10: 7
PAINLEVEL_OUTOF10: 8

## 2024-04-24 ASSESSMENT — PAIN - FUNCTIONAL ASSESSMENT: PAIN_FUNCTIONAL_ASSESSMENT: 0-10

## 2024-04-25 ENCOUNTER — APPOINTMENT (OUTPATIENT)
Dept: RADIOLOGY | Facility: HOSPITAL | Age: 34
End: 2024-04-25
Payer: COMMERCIAL

## 2024-04-25 ENCOUNTER — TELEPHONE (OUTPATIENT)
Dept: OBSTETRICS AND GYNECOLOGY | Facility: CLINIC | Age: 34
End: 2024-04-25
Payer: COMMERCIAL

## 2024-04-25 VITALS
OXYGEN SATURATION: 97 % | SYSTOLIC BLOOD PRESSURE: 126 MMHG | HEART RATE: 75 BPM | TEMPERATURE: 98.8 F | DIASTOLIC BLOOD PRESSURE: 75 MMHG | RESPIRATION RATE: 16 BRPM

## 2024-04-25 LAB
EST. AVERAGE GLUCOSE BLD GHB EST-MCNC: 120 MG/DL
FERRITIN SERPL-MCNC: 16 NG/ML
HBA1C MFR BLD: 5.8 %
HCV AB SER QL: NONREACTIVE
IRON SATN MFR SERPL: 9 % (ref 25–45)
IRON SERPL-MCNC: 46 UG/DL (ref 35–150)
TIBC SERPL-MCNC: 490 UG/DL (ref 240–445)
TREPONEMA PALLIDUM IGG+IGM AB [PRESENCE] IN SERUM OR PLASMA BY IMMUNOASSAY: NONREACTIVE
UIBC SERPL-MCNC: 444 UG/DL (ref 110–370)

## 2024-04-25 PROCEDURE — 99215 OFFICE O/P EST HI 40 MIN: CPT

## 2024-04-25 PROCEDURE — 72170 X-RAY EXAM OF PELVIS: CPT

## 2024-04-25 PROCEDURE — 72170 X-RAY EXAM OF PELVIS: CPT | Performed by: STUDENT IN AN ORGANIZED HEALTH CARE EDUCATION/TRAINING PROGRAM

## 2024-04-25 PROCEDURE — RXMED WILLOW AMBULATORY MEDICATION CHARGE

## 2024-04-25 RX ORDER — OXYCODONE HYDROCHLORIDE 5 MG/1
5 TABLET ORAL EVERY 6 HOURS PRN
Qty: 4 TABLET | Refills: 0 | Status: SHIPPED | OUTPATIENT
Start: 2024-04-25 | End: 2024-04-27

## 2024-04-25 RX ORDER — FERROUS SULFATE 325(65) MG
65 TABLET ORAL
Qty: 30 TABLET | Refills: 1 | Status: SHIPPED | OUTPATIENT
Start: 2024-04-25 | End: 2024-06-24

## 2024-04-25 ASSESSMENT — PAIN SCALES - GENERAL: PAINLEVEL_OUTOF10: 7

## 2024-04-25 NOTE — H&P
Obstetrical Triage History and Physical     Jing Menjivar is a 33 y.o.  at 34w6d by LMP c/w 9w4d US who presents to triage s/p fall with associated pelvic and hip pain.    Chief Complaint: Loss of Consciousness (Fall today 23 around 1800. Patient denies abdominal trauma.)    Assessment/Plan    S/p Fall  - abdomen soft  - Coags, Fibrinogen WNL  - low suspicion for abruption    Pelvic Pain  - c/o 10/10 pelvic and hip pain after fall   - SVE: unable to assess cervix, very posterior  - toco: irregular ctxs/irritability   - low suspicion for PTL   - pelvic XR without evidence of pelvic or hip fracture, showing separation of pubic symphysis  - pt able to ambulate to bathroom  - BLE neurovascularly intact   - given Flexeril 10 mg, Tylenol 975 mg for pain -> pain still 10/10 -> given oxycodone 10 mg -> pain now 5/10   - short course of oxycodone 5 mg sent to pharmacy, encouraged use of Tylenol prn and belly band for support, consider chiropractic care at [facility]     Syncope  - EKG NSR   - pt declines orthostatics   - CBC significant for anemia with hgb 9.5, o/w CBC and CMP WNL  - suspect due to physiologic changes of pregnancy +/- symptomatic anemia     Anemia  - hgb 9.5  - ferritin 16  - PO Fe sent at DC     Maternal Well-being  - Vital signs stable and WNL  - Emotional support and reassurance provided  - All questions and concerns addressed    IUP @ 34w6d  - prenatal lab work reviewed  - needs 1 hr GTT  - last growth 3/7 EFW 1063g 21%, AC 23%  - NST reactive  - continue routine prenatal care    Dispo:   - appropriate for discharge to home, patient comfortable with this   - follow-up with CNM on  for routine prenatal visit   - return precautions reviewed     Pt and tracing seen and discussed with Dr. Abreu who agrees with plan    Brianna Shannon PA-C  24 1:32 AM  Vocera    Active Problems:  There are no active Hospital Problems.      Pregnancy Problems (from 10/27/23 to present)        Problem Noted Resolved    Small head circumference 2024 by MAGNOLIA Oneil No    Priority:  Medium      Overview Signed 2024  1:15 PM by MAGNOLIA Oneil     Repeat US in 3wk from previous  Patient offered genetic testing- declined   Will do repeat US eval          Moderate persistent asthma without complication ([facility]) 2024 by MAGNOLIA Oneil No    Priority:  Medium      Depression affecting pregnancy in second trimester, antepartum ([facility]) 2024 by MAGNOLIA Oneil No    Priority:  Medium      Encounter for supervision of high risk pregnancy in first trimester, antepartum ([facility]) 2023 by Jane Starkey MD No    Priority:  Medium      Depression affecting pregnancy in first trimester, antepartum ([facility]) 2023 by Jane Starkey MD No    Priority:  Medium      Overview Addendum 2024  3:35 PM by MAGNOLIA Oneil     H/o depression in previous pregnancies, previously on Wellbutrin and followed with therapist     Was previously on risperidone with last pregnancy- says happens every pregnancy and gets worse postpartum               Carmen Ch is a 33 y.o.  at 34w6d by LMP c/w 9w4d US who presents to triage s/p fall with associated pelvic and hip pain. Per partner at bedside just prior to arrival pt had a syncopal episode. She was standing when she suddenly fell on her bottom onto the ground. He states that she did not hit her belly or her head. Per partner she seemed to lose consciousness for a couple of seconds but came to quickly. Pt states she felt hot and dizzy prior to episode. Denies chest pain, palpitations, or SOB at time of episode. States this has happened throughout the pregnancy but she is usually able to sit down and symptoms resolved. Since the fall she is c/o severe 10/10 hip and pelvic pain and states she is unable to walk or move her legs. Denies bowel or bladder  incontinence. Denies VB, LOF, or ctxs. Reports good FM.    Obstetrical History   OB History    Para Term  AB Living   6 3 3 0 2 3   SAB IAB Ectopic Multiple Live Births     2 0 0 3      # Outcome Date GA Lbr Gustavo/2nd Weight Sex Delivery Anes PTL Lv   6 Current            5 Term            4 Term            3 Term            2 IAB            1 IAB                Past Medical History  Past Medical History:   Diagnosis Date    31 weeks gestation of pregnancy ([facility]) 10/21/2021    31 weeks gestation of pregnancy    Encounter for  screening, unspecified ([facility]) 2021    Encounter for fetal ultrasound    Encounter for immunization 2018    Need for Tdap vaccination    Encounter for pregnancy test, result positive ([facility]) 2014    Positive pregnancy test    Encounter for supervision of normal first pregnancy, unspecified trimester ([facility]) 2015    Pregnancy, first    Personal history of diseases of the skin and subcutaneous tissue     History of eczema    Personal history of other diseases of the respiratory system 2014    History of asthma    Personal history of other drug therapy 10/21/2021    History of influenza vaccination        Past Surgical History   Past Surgical History:   Procedure Laterality Date    OTHER SURGICAL HISTORY  2021    Dilation and curettage    OTHER SURGICAL HISTORY  2015    Prior Surgical Procedure Not Done       Social History  Social History     Tobacco Use    Smoking status: Never    Smokeless tobacco: Never   Substance Use Topics    Alcohol use: Not Currently     Substance and Sexual Activity   Drug Use Never       Allergies  Patient has no known allergies.     Medications  Medications Prior to Admission   Medication Sig Dispense Refill Last Dose    doxylamine (Unisom) 25 mg tablet Take 1 tablet (25 mg) by mouth as needed at bedtime for sleep. 30 tablet 0     loratadine (Claritin) 10 mg tablet Take 1 tablet (10 mg) by mouth  once daily. 30 tablet 11     metoclopramide (Reglan) 10 mg tablet Take 1 tablet (10 mg) by mouth every 8 hours if needed (headaches) for up to 10 days. 14 tablet 1     sertraline (Zoloft) 25 mg tablet Take 2 tablets (50 mg) by mouth once daily. 60 tablet 3        Objective    Last Vitals  Temp Pulse Resp BP MAP O2 Sat   37.1 °C (98.8 °F) 76 16 126/63   97 %     Physical Examination  GENERAL: Examination reveals a well developed, well nourished, gravid female in no acute distress. She is alert and cooperative and sleeping comfortably in room .  LUNGS: Normal respiratory effort  ABDOMEN:  soft, gravid, mild TTP over lower pelvis and pubic symphysis  CERVIX:  unable to assess, very posterior ; MEMBRANES are Intact  EXTREMITIES: no redness or tenderness in the calves or thighs, no edema, intact peripheral pulses  NEUROLOGICAL: alert, oriented, normal speech, no focal findings or movement disorder noted, BLE with normal sensation and strength , able to ambulate and moves legs spontaneously in bed  PSYCHOLOGICAL: awake and alert; oriented to person, place, and time    Non-Stress Test   Baseline Fetal Heart Rate for Non-Stress Test: 135 BPM  Variability in Waveform for Non-Stress Test: Moderate  Accelerations in Non-Stress Test: Yes, lasting at least 15 seconds, greater than/equal to 15 bpm  Decelerations in Non-Stress Test: None  Contractions in Non-Stress Test: Irregular  Interpretation of Non-Stress Test   Interpretation of Non-Stress Test: Reactive    Lab Review  Lab Results   Component Value Date    WBC 15.8 (H) 04/24/2024    HGB 9.5 (L) 04/24/2024    HCT 30.1 (L) 04/24/2024     04/24/2024     Lab Results   Component Value Date    GLUCOSE 91 04/24/2024     (L) 04/24/2024    K 3.7 04/24/2024     04/24/2024    CO2 21 04/24/2024    ANIONGAP 13 04/24/2024    BUN 6 04/24/2024    CREATININE 0.41 (L) 04/24/2024    EGFR >90 04/24/2024    CALCIUM 8.5 (L) 04/24/2024    ALBUMIN 3.2 (L) 04/24/2024    PROT 6.5  04/24/2024    ALKPHOS 83 04/24/2024    ALT 8 04/24/2024    AST 9 04/24/2024    BILITOT 0.2 04/24/2024     Lab Results   Component Value Date    APTT 26 (L) 04/24/2024    PROTIME 10.5 04/24/2024    INR 0.9 04/24/2024     Lab Results   Component Value Date    FIBRINOGEN 425 (H) 04/24/2024

## 2024-04-25 NOTE — TELEPHONE ENCOUNTER
Pt called and scheduled for dr Winslow tomorrow. Did not cancel apt for cnm on 5/7    ----- Message from Katlin Abreu MD sent at 4/25/2024  6:56 AM EDT -----  Regarding: Appointment  Hi!     Jing needs to be seen as soon as possible at [facility] she has not been seen in many months. Can we get her an appointment ASAP?    Katlin Abreu MD

## 2024-04-26 ENCOUNTER — ROUTINE PRENATAL (OUTPATIENT)
Dept: OBSTETRICS AND GYNECOLOGY | Facility: CLINIC | Age: 34
End: 2024-04-26
Payer: COMMERCIAL

## 2024-04-26 ENCOUNTER — PHARMACY VISIT (OUTPATIENT)
Dept: PHARMACY | Facility: CLINIC | Age: 34
End: 2024-04-26
Payer: MEDICAID

## 2024-04-26 VITALS — BODY MASS INDEX: 35.51 KG/M2 | SYSTOLIC BLOOD PRESSURE: 117 MMHG | WEIGHT: 213.4 LBS | DIASTOLIC BLOOD PRESSURE: 72 MMHG

## 2024-04-26 DIAGNOSIS — O99.013 ANEMIA AFFECTING PREGNANCY IN THIRD TRIMESTER (HHS-HCC): ICD-10-CM

## 2024-04-26 DIAGNOSIS — Z3A.35 35 WEEKS GESTATION OF PREGNANCY (HHS-HCC): ICD-10-CM

## 2024-04-26 DIAGNOSIS — O36.5930: ICD-10-CM

## 2024-04-26 DIAGNOSIS — R10.2 PELVIC PAIN: Primary | ICD-10-CM

## 2024-04-26 DIAGNOSIS — O99.013 ANEMIA DURING PREGNANCY IN THIRD TRIMESTER (HHS-HCC): ICD-10-CM

## 2024-04-26 PROBLEM — O92.70 UNSPECIFIED DISORDERS OF LACTATION (HHS-HCC): Status: RESOLVED | Noted: 2023-10-24 | Resolved: 2024-04-26

## 2024-04-26 PROBLEM — F32.A DEPRESSION AFFECTING PREGNANCY IN SECOND TRIMESTER, ANTEPARTUM (HHS-HCC): Status: RESOLVED | Noted: 2024-02-09 | Resolved: 2024-04-26

## 2024-04-26 PROBLEM — O99.342 DEPRESSION AFFECTING PREGNANCY IN SECOND TRIMESTER, ANTEPARTUM (HHS-HCC): Status: RESOLVED | Noted: 2024-02-09 | Resolved: 2024-04-26

## 2024-04-26 PROBLEM — L20.82 FLEXURAL ECZEMA: Status: RESOLVED | Noted: 2018-02-16 | Resolved: 2024-04-26

## 2024-04-26 PROBLEM — K21.9 GERD (GASTROESOPHAGEAL REFLUX DISEASE): Status: RESOLVED | Noted: 2023-10-24 | Resolved: 2024-04-26

## 2024-04-26 PROCEDURE — 99213 OFFICE O/P EST LOW 20 MIN: CPT | Mod: GC,TH | Performed by: STUDENT IN AN ORGANIZED HEALTH CARE EDUCATION/TRAINING PROGRAM

## 2024-04-26 PROCEDURE — 99213 OFFICE O/P EST LOW 20 MIN: CPT | Performed by: STUDENT IN AN ORGANIZED HEALTH CARE EDUCATION/TRAINING PROGRAM

## 2024-04-26 PROCEDURE — 90715 TDAP VACCINE 7 YRS/> IM: CPT | Mod: GC | Performed by: STUDENT IN AN ORGANIZED HEALTH CARE EDUCATION/TRAINING PROGRAM

## 2024-04-26 RX ORDER — FAMOTIDINE 10 MG/ML
20 INJECTION INTRAVENOUS ONCE AS NEEDED
OUTPATIENT
Start: 2024-04-26

## 2024-04-26 RX ORDER — EPINEPHRINE 0.3 MG/.3ML
0.3 INJECTION SUBCUTANEOUS EVERY 5 MIN PRN
OUTPATIENT
Start: 2024-04-26

## 2024-04-26 RX ORDER — ALBUTEROL SULFATE 0.83 MG/ML
3 SOLUTION RESPIRATORY (INHALATION) AS NEEDED
OUTPATIENT
Start: 2024-04-26

## 2024-04-26 RX ORDER — FERROUS GLUCONATE 324(38)MG
38 TABLET ORAL
Qty: 30 TABLET | Refills: 11 | Status: SHIPPED | OUTPATIENT
Start: 2024-04-26 | End: 2025-04-26

## 2024-04-26 RX ORDER — DIPHENHYDRAMINE HYDROCHLORIDE 50 MG/ML
50 INJECTION INTRAMUSCULAR; INTRAVENOUS AS NEEDED
OUTPATIENT
Start: 2024-04-26

## 2024-04-26 ASSESSMENT — ENCOUNTER SYMPTOMS
ALLERGIC/IMMUNOLOGIC NEGATIVE: 0
CONSTITUTIONAL NEGATIVE: 0
CARDIOVASCULAR NEGATIVE: 0
EYES NEGATIVE: 0
RESPIRATORY NEGATIVE: 0
ENDOCRINE NEGATIVE: 0
NEUROLOGICAL NEGATIVE: 0
MUSCULOSKELETAL NEGATIVE: 0
GASTROINTESTINAL NEGATIVE: 0
HEMATOLOGIC/LYMPHATIC NEGATIVE: 0
PSYCHIATRIC NEGATIVE: 0

## 2024-04-26 NOTE — ASSESSMENT & PLAN NOTE
1-hr GTT pending, discussed completing it as soon as able. Patient unable to complete today due to childcare, order placed to complete at later date.  Breast pump requested, plans to breast/bottle feed  Desires ppIUD   Tdap given today

## 2024-04-26 NOTE — PROGRESS NOTES
Subjective   Patient ID [id number]   Jing Menjivar is a 33 y.o.  at 35w0d with a working estimated date of delivery of 2024, by Last Menstrual Period who presents for a routine prenatal visit. She denies vaginal bleeding, leakage of fluid, decreased fetal movements.    Two days ago pt fainted, fell (denies abdominal trauma), and presented to OB Triage at [facility]. At that time, pt stated she had 10/10 right hip pain. Pelvic x-ray was negative for fracture. Since then, the hip pain has improved slightly to 7/10 although she continues to have difficulty walking.     Pt has had intermittent contractions, but states they do not feel like labor.       Objective   Physical Exam:   Weight: 96.8 kg (213 lb 6.4 oz)  Expected Total Weight Gain: 5 kg (11 lb)-9 kg (19 lb)   Pregravid BMI: 31.62  BP: 117/72 (Pluse-79)  Fetal Heart Rate: 126 Fundal Height (cm): 32 cm           Fundal height: 32  Fetal heart tones: 126    Prenatal Labs  Urine Dip:  Lab Results   Component Value Date    KETONESU NEGATIVE 2020     Lab Results   Component Value Date    HGB 9.5 (L) 2024    HCT 30.1 (L) 2024    ABO AB 2023    HEPBSAG Nonreactive 2023     Imaging: has not had ultrasound since follow-up anatomy ultrasound on 3/7/24, which demonstrated normal interval growth and  no abnormalities.    Assessment/Plan   Problem List Items Addressed This Visit             ICD-10-CM    Encounter for maternal care for suspected poor fetal growth in valadez pregnancy in third trimester ([facility]) O36.5930     S<D on    Growth US ordered          Anemia during pregnancy in third trimester ([facility]) O99.013     Hgb 9.5 , PO Fe sent    Fe deficiency anemia          35 weeks gestation of pregnancy ([facility]) Z3A.35     1-hr GTT pending, discussed completing it as soon as able. Patient unable to complete today due to childcare, order placed to complete at later date.  Breast pump requested, plans to  breast/bottle feed  Desires ppIUD   Tdap given today          Relevant Orders    Glucose, 1 Hour Screen, Pregnancy    US MAC OB imaging order     Other Visit Diagnoses         Codes    Pelvic pain    -  Primary R10.2    Relevant Orders    Referral to Physical Therapy    Anemia affecting pregnancy in third trimester ([facility])     O99.013    Relevant Medications    ferrous gluconate 324 (38 Fe) mg tablet          Continue prenatal vitamin.  Prescribed PO iron supplementation.  Need 1-hour glucose test  Received Tdap today.  Scheduled fetal growth ultrasound in the setting of S<D  Expected mode of delivery is induced vaginal delivery  Pelvic floor PT info given   Follow up in 1 week for a routine prenatal visit.    Patient seen and discussed with attending physician.    Siomara Bonilla  MS-3, Obstetrics and Gynecology    Patient seen and examined w medical student, edits made in text. Seen and dw Dr Kate Winslow MD  OB/GYN PGY3

## 2024-05-03 ENCOUNTER — ROUTINE PRENATAL (OUTPATIENT)
Dept: OBSTETRICS AND GYNECOLOGY | Facility: CLINIC | Age: 34
End: 2024-05-03
Payer: COMMERCIAL

## 2024-05-03 ENCOUNTER — HOSPITAL ENCOUNTER (OUTPATIENT)
Dept: RADIOLOGY | Facility: CLINIC | Age: 34
Discharge: HOME | End: 2024-05-03
Payer: COMMERCIAL

## 2024-05-03 VITALS — DIASTOLIC BLOOD PRESSURE: 77 MMHG | SYSTOLIC BLOOD PRESSURE: 120 MMHG | BODY MASS INDEX: 35.69 KG/M2 | WEIGHT: 214.5 LBS

## 2024-05-03 DIAGNOSIS — O36.5930: ICD-10-CM

## 2024-05-03 DIAGNOSIS — J45.40 MODERATE PERSISTENT ASTHMA WITHOUT COMPLICATION (HHS-HCC): ICD-10-CM

## 2024-05-03 DIAGNOSIS — O99.013 ANEMIA DURING PREGNANCY IN THIRD TRIMESTER (HHS-HCC): ICD-10-CM

## 2024-05-03 DIAGNOSIS — Z3A.36 36 WEEKS GESTATION OF PREGNANCY (HHS-HCC): ICD-10-CM

## 2024-05-03 DIAGNOSIS — F32.A DEPRESSION AFFECTING PREGNANCY IN FIRST TRIMESTER, ANTEPARTUM (HHS-HCC): ICD-10-CM

## 2024-05-03 DIAGNOSIS — Z3A.24 24 WEEKS GESTATION OF PREGNANCY (HHS-HCC): ICD-10-CM

## 2024-05-03 DIAGNOSIS — O99.341 DEPRESSION AFFECTING PREGNANCY IN FIRST TRIMESTER, ANTEPARTUM (HHS-HCC): ICD-10-CM

## 2024-05-03 DIAGNOSIS — R68.89 SMALL HEAD CIRCUMFERENCE: ICD-10-CM

## 2024-05-03 PROCEDURE — 87081 CULTURE SCREEN ONLY: CPT

## 2024-05-03 PROCEDURE — 76819 FETAL BIOPHYS PROFIL W/O NST: CPT

## 2024-05-03 PROCEDURE — 76816 OB US FOLLOW-UP PER FETUS: CPT | Performed by: OBSTETRICS & GYNECOLOGY

## 2024-05-03 PROCEDURE — 99213 OFFICE O/P EST LOW 20 MIN: CPT

## 2024-05-03 PROCEDURE — 99213 OFFICE O/P EST LOW 20 MIN: CPT | Mod: GC,TH

## 2024-05-03 PROCEDURE — 76819 FETAL BIOPHYS PROFIL W/O NST: CPT | Performed by: OBSTETRICS & GYNECOLOGY

## 2024-05-03 PROCEDURE — 76816 OB US FOLLOW-UP PER FETUS: CPT

## 2024-05-03 ASSESSMENT — ENCOUNTER SYMPTOMS
PSYCHIATRIC NEGATIVE: 0
NEUROLOGICAL NEGATIVE: 0
GASTROINTESTINAL NEGATIVE: 0
EYES NEGATIVE: 0
CARDIOVASCULAR NEGATIVE: 0
HEMATOLOGIC/LYMPHATIC NEGATIVE: 0
MUSCULOSKELETAL NEGATIVE: 0
CONSTITUTIONAL NEGATIVE: 0
ALLERGIC/IMMUNOLOGIC NEGATIVE: 0
RESPIRATORY NEGATIVE: 0
ENDOCRINE NEGATIVE: 0

## 2024-05-03 ASSESSMENT — EDINBURGH POSTNATAL DEPRESSION SCALE (EPDS)
I HAVE BEEN ABLE TO LAUGH AND SEE THE FUNNY SIDE OF THINGS: AS MUCH AS I ALWAYS COULD
THINGS HAVE BEEN GETTING ON TOP OF ME: NO, I HAVE BEEN COPING AS WELL AS EVER
THE THOUGHT OF HARMING MYSELF HAS OCCURRED TO ME: NEVER
I HAVE BEEN ANXIOUS OR WORRIED FOR NO GOOD REASON: NO, NOT AT ALL
I HAVE LOOKED FORWARD WITH ENJOYMENT TO THINGS: AS MUCH AS I EVER DID
I HAVE BEEN SO UNHAPPY THAT I HAVE HAD DIFFICULTY SLEEPING: NOT AT ALL
I HAVE BEEN SO UNHAPPY THAT I HAVE BEEN CRYING: NO, NEVER
TOTAL SCORE: 3
I HAVE BLAMED MYSELF UNNECESSARILY WHEN THINGS WENT WRONG: NO, NEVER
I HAVE FELT SAD OR MISERABLE: YES, MOST OF THE TIME
I HAVE FELT SCARED OR PANICKY FOR NO GOOD REASON: NO, NOT AT ALL

## 2024-05-03 NOTE — PROGRESS NOTES
Subjective   Patient ID [id number]   Jing Menjivar is a 33 y.o.  at 36w0d with a working estimated date of delivery of 2024, by Last Menstrual Period who presents for a routine prenatal visit. She denies vaginal bleeding, leakage of fluid, decreased fetal movements, or contractions. Patient has no complaints today.      Objective   Physical Exam:   Weight: 97.3 kg (214 lb 8 oz)  Expected Total Weight Gain: 5 kg (11 lb)-9 kg (19 lb)   Pregravid BMI: 31.62  BP: 120/77  Fetal Heart Rate: us               Prenatal Labs  Urine Dip:  Lab Results   Component Value Date    KETONESU NEGATIVE 2020     Lab Results   Component Value Date    HGB 9.5 (L) 2024    HCT 30.1 (L) 2024    ABO AB 2023    HEPBSAG Nonreactive 2023     Imaging  The most recent ultrasound was performed on The most recent ultrasound study is not finalized with a study GA of The most recent ultrasound study is not finalized and EFW of The most recent ultrasound study is not finalized.  The most recent ultrasound study is not finalized  The most recent ultrasound study is not finalized    Assessment/Plan   Problem List Items Addressed This Visit       36 weeks gestation of pregnancy ([facility])    Overview     Dating: LMP cw 9w us   [x] Initial BMI: 35  [] Prenatal Labs:   [] Genetic Screening:    [] Baby ASA:  [] Anatomy US:  [] 1hr GCT at 24-28wks: Drawn 5/3   [x] Tdap (27-36wks):   [] Flu Shot:  [] COVID vaccine:   [] Rhogam (if Rh neg):   [x] GBS at 36 wks: collected 5/3  [x] Breastfeeding pump requested   [x] Postpartum Birth control method: ppIUD  [x] 39 weeks discussion of IOL vs. Expectant management: desires 39w IOL, requested date   [x] Mode of delivery:  anticipate vaginal          Relevant Orders    Group B Streptococcus (GBS) Prenatal Screen, Culture    Anemia during pregnancy in third trimester ([facility])    Overview     Hgb 9.5 , PO Fe sent . Scheduled for IV iron infusion on .          Depression affecting pregnancy in first trimester, antepartum ([facility])    Overview     H/o depression in previous pregnancies, previously on Wellbutrin and followed with therapist     Was previously on risperidone with last pregnancy- says happens every pregnancy and gets worse postpartum         Current Assessment & Plan     Mood is good today.         Encounter for maternal care for suspected poor fetal growth in valadez pregnancy in third trimester ([facility])    Overview     S<D on 4/26  Growth US 5/3 - normal growth pattern         Moderate persistent asthma without complication ([facility])    Overview     No current inhaler use         Small head circumference    Overview     Patient offered genetic testing- declined   Repeat US eval 5/3 - wnl           Follow up in 1 week for a routine prenatal visit.    Seen and discussed w/ Dr. Yang Crenshaw MD PGY-1  Obstetrics & Gynecology

## 2024-05-05 LAB — GP B STREP GENITAL QL CULT: NORMAL

## 2024-05-10 ENCOUNTER — TELEPHONE (OUTPATIENT)
Dept: OBSTETRICS AND GYNECOLOGY | Facility: HOSPITAL | Age: 34
End: 2024-05-10

## 2024-05-10 ENCOUNTER — TELEPHONE (OUTPATIENT)
Dept: OBSTETRICS AND GYNECOLOGY | Facility: CLINIC | Age: 34
End: 2024-05-10
Payer: COMMERCIAL

## 2024-05-10 DIAGNOSIS — Z3A.39 39 WEEKS GESTATION OF PREGNANCY (HHS-HCC): Primary | ICD-10-CM

## 2024-05-10 NOTE — TELEPHONE ENCOUNTER
Induction of labor per Dr Winslow at 39 weeks GA  Scheduled for 5/24/24  Arrive at 1700  Patient notified  Visitor Policy reviewed.     Missed last OBFU, rescheduled for 5/14

## 2024-05-10 NOTE — TELEPHONE ENCOUNTER
----- Message from Lillie Winslow MD sent at 4/26/2024  1:31 PM EDT -----  Hi!     This patient would like to be set up with a breat pump. She also is 35w today and would like to be scheduled for an induction of labor at 39w     Thank you!  Lillie

## 2024-05-14 ENCOUNTER — ROUTINE PRENATAL (OUTPATIENT)
Dept: OBSTETRICS AND GYNECOLOGY | Facility: CLINIC | Age: 34
End: 2024-05-14
Payer: COMMERCIAL

## 2024-05-14 VITALS — SYSTOLIC BLOOD PRESSURE: 104 MMHG | BODY MASS INDEX: 36.19 KG/M2 | WEIGHT: 217.5 LBS | DIASTOLIC BLOOD PRESSURE: 69 MMHG

## 2024-05-14 DIAGNOSIS — O09.33 LIMITED PRENATAL CARE IN THIRD TRIMESTER (HHS-HCC): ICD-10-CM

## 2024-05-14 DIAGNOSIS — O09.93 SUPERVISION OF HIGH RISK PREGNANCY IN THIRD TRIMESTER (HHS-HCC): Primary | ICD-10-CM

## 2024-05-14 DIAGNOSIS — F32.A DEPRESSION AFFECTING PREGNANCY IN FIRST TRIMESTER, ANTEPARTUM (HHS-HCC): ICD-10-CM

## 2024-05-14 DIAGNOSIS — O99.341 DEPRESSION AFFECTING PREGNANCY IN FIRST TRIMESTER, ANTEPARTUM (HHS-HCC): ICD-10-CM

## 2024-05-14 DIAGNOSIS — O99.013 ANEMIA DURING PREGNANCY IN THIRD TRIMESTER (HHS-HCC): ICD-10-CM

## 2024-05-14 DIAGNOSIS — Z3A.37 37 WEEKS GESTATION OF PREGNANCY (HHS-HCC): ICD-10-CM

## 2024-05-14 DIAGNOSIS — O36.5930: ICD-10-CM

## 2024-05-14 DIAGNOSIS — J45.40 MODERATE PERSISTENT ASTHMA WITHOUT COMPLICATION (HHS-HCC): ICD-10-CM

## 2024-05-14 PROCEDURE — 99214 OFFICE O/P EST MOD 30 MIN: CPT | Mod: GC,TH

## 2024-05-14 PROCEDURE — 99214 OFFICE O/P EST MOD 30 MIN: CPT

## 2024-05-14 ASSESSMENT — ENCOUNTER SYMPTOMS
MUSCULOSKELETAL NEGATIVE: 0
PSYCHIATRIC NEGATIVE: 0
CARDIOVASCULAR NEGATIVE: 0
GASTROINTESTINAL NEGATIVE: 0
ENDOCRINE NEGATIVE: 0
RESPIRATORY NEGATIVE: 0
HEMATOLOGIC/LYMPHATIC NEGATIVE: 0
ALLERGIC/IMMUNOLOGIC NEGATIVE: 0
CONSTITUTIONAL NEGATIVE: 0
NEUROLOGICAL NEGATIVE: 0
EYES NEGATIVE: 0

## 2024-05-14 NOTE — ASSESSMENT & PLAN NOTE
Up to date - except 1 hr. Discussed likely BG checks in labor  Labor precautions discussed. IOL scheduled on 5/24

## 2024-05-14 NOTE — PROGRESS NOTES
Ob Follow-up  24     SUBJECTIVE      HPI: Jing Menjivar is a 33 y.o.  at 37w4d here for RPNV.  She has intermittent  contractions, no bleeding, or no LOF. Reports normal fetal movement.        OBJECTIVE  Visit Vitals  /69   Wt 98.7 kg (217 lb 8 oz)   LMP 2023 (Exact Date)   BMI 36.19 kg/m²   OB Status Pregnant   Smoking Status Never   BSA 2.13 m²            ASSESSMENT & PLAN    Jing Menjivar is a 33 y.o.  at 37w4d here for the following concerns we addressed today:    Problem List Items Addressed This Visit          Pregnancy    Supervision of high risk pregnancy in third trimester ([facility]) - Primary    Moderate persistent asthma without complication ([facility])    Overview     No current inhaler use         Current Assessment & Plan     No inhaler use         Limited prenatal care in third trimester ([facility])    Overview     Only 4 prenatal visits         Encounter for maternal care for suspected poor fetal growth in valadez pregnancy in third trimester ([facility])    Overview     S<D on   Growth US 5/3 - normal growth pattern         Current Assessment & Plan     S < D. Normal growth 5/3         Depression affecting pregnancy in first trimester, antepartum ([facility])    Overview     H/o depression in previous pregnancies, previously on Wellbutrin and followed with therapist     Was previously on risperidone with last pregnancy- says happens every pregnancy and gets worse postpartum         Current Assessment & Plan     No current mood issues. Postpartum precautions discussed         Anemia during pregnancy in third trimester ([facility])    Overview     Hgb 9.5 , PO Fe sent . Scheduled for IV iron infusion on .         Current Assessment & Plan     Was scheduled for IV iron infusion however missed appointment. Discussed taking PO iron every other night until delivery. Patient accepts blood products if needed.         37 weeks gestation of pregnancy ([facility])     Overview     Dating: LMP cw 9w us   [x] Initial BMI: 35  [x] Prenatal Labs:   [x] Genetic Screening:    [] Baby ASA:  [x] Anatomy US:  [] 1hr GCT at 24-28wks: drank 5/3 - blood not drawn  [x] Tdap (27-36wks): 4/26  [] Flu Shot: declined  [] COVID vaccine: declined  [x] Rhogam (if Rh neg): n/a  [x] GBS at 36 wks: collected 5/3  [x] Breastfeeding pump requested 4/26  [x] Postpartum Birth control method: ppIUD  [x] 39 weeks discussion of IOL vs. Expectant management: desires 39w IOL, requested date   [x] Mode of delivery:  anticipate vaginal          Current Assessment & Plan     Up to date - except 1 hr. Discussed likely BG checks in labor  Labor precautions discussed. IOL scheduled on 5/24            OBGYN Infusion    37 weeks gestation of pregnancy ([facility])    Overview     Dating: LMP cw 9w us   [x] Initial BMI: 35  [x] Prenatal Labs:   [x] Genetic Screening:    [] Baby ASA:  [x] Anatomy US:  [] 1hr GCT at 24-28wks: drank 5/3 - blood not drawn  [x] Tdap (27-36wks): 4/26  [] Flu Shot: declined  [] COVID vaccine: declined  [x] Rhogam (if Rh neg): n/a  [x] GBS at 36 wks: collected 5/3  [x] Breastfeeding pump requested 4/26  [x] Postpartum Birth control method: ppIUD  [x] 39 weeks discussion of IOL vs. Expectant management: desires 39w IOL, requested date   [x] Mode of delivery:  anticipate vaginal          Current Assessment & Plan     Up to date - except 1 hr. Discussed likely BG checks in labor  Labor precautions discussed. IOL scheduled on 5/24              No orders of the defined types were placed in this encounter.       RTC in 1 week  Seen and discussed with Dr. Xavi Cast MD

## 2024-05-14 NOTE — PROGRESS NOTES
I saw and examined the patient.  I personally obtained the key and critical portions of the history and physical exam, and was physically present for the key and critical portions performed by the resident.  I reviewed the resident's documentation and discussed the patient with the resident.  I agreed with the resident's medical decision-making as documented in the resident's note.  Corinne Bazella MD

## 2024-05-14 NOTE — ASSESSMENT & PLAN NOTE
Was scheduled for IV iron infusion however missed appointment. Discussed taking PO iron every other night until delivery. Patient accepts blood products if needed.

## 2024-05-24 ENCOUNTER — ANESTHESIA EVENT (OUTPATIENT)
Dept: OBSTETRICS AND GYNECOLOGY | Facility: HOSPITAL | Age: 34
End: 2024-05-24
Payer: COMMERCIAL

## 2024-05-24 ENCOUNTER — HOSPITAL ENCOUNTER (INPATIENT)
Facility: HOSPITAL | Age: 34
LOS: 2 days | Discharge: HOME | End: 2024-05-26
Attending: OBSTETRICS & GYNECOLOGY | Admitting: STUDENT IN AN ORGANIZED HEALTH CARE EDUCATION/TRAINING PROGRAM
Payer: COMMERCIAL

## 2024-05-24 ENCOUNTER — ANESTHESIA (OUTPATIENT)
Dept: OBSTETRICS AND GYNECOLOGY | Facility: HOSPITAL | Age: 34
End: 2024-05-24
Payer: COMMERCIAL

## 2024-05-24 ENCOUNTER — HOSPITAL ENCOUNTER (INPATIENT)
Facility: HOSPITAL | Age: 34
LOS: 1 days | Discharge: HOME | End: 2024-05-24
Attending: OBSTETRICS & GYNECOLOGY | Admitting: OBSTETRICS & GYNECOLOGY
Payer: COMMERCIAL

## 2024-05-24 ENCOUNTER — APPOINTMENT (OUTPATIENT)
Dept: OBSTETRICS AND GYNECOLOGY | Facility: HOSPITAL | Age: 34
End: 2024-05-24
Payer: COMMERCIAL

## 2024-05-24 DIAGNOSIS — Z3A.39 39 WEEKS GESTATION OF PREGNANCY (HHS-HCC): ICD-10-CM

## 2024-05-24 LAB
ABO GROUP (TYPE) IN BLOOD: NORMAL
ANTIBODY SCREEN: NORMAL
ERYTHROCYTE [DISTWIDTH] IN BLOOD BY AUTOMATED COUNT: 16.7 % (ref 11.5–14.5)
GLUCOSE BLD MANUAL STRIP-MCNC: 101 MG/DL (ref 74–99)
GLUCOSE BLD MANUAL STRIP-MCNC: 94 MG/DL (ref 74–99)
HCT VFR BLD AUTO: 31.2 % (ref 36–46)
HGB BLD-MCNC: 9.8 G/DL (ref 12–16)
MCH RBC QN AUTO: 25.4 PG (ref 26–34)
MCHC RBC AUTO-ENTMCNC: 31.4 G/DL (ref 32–36)
MCV RBC AUTO: 81 FL (ref 80–100)
NRBC BLD-RTO: 0 /100 WBCS (ref 0–0)
PLATELET # BLD AUTO: 219 X10*3/UL (ref 150–450)
RBC # BLD AUTO: 3.86 X10*6/UL (ref 4–5.2)
RH FACTOR (ANTIGEN D): NORMAL
TREPONEMA PALLIDUM IGG+IGM AB [PRESENCE] IN SERUM OR PLASMA BY IMMUNOASSAY: NONREACTIVE
WBC # BLD AUTO: 13.5 X10*3/UL (ref 4.4–11.3)

## 2024-05-24 PROCEDURE — 1120000001 HC OB PRIVATE ROOM DAILY

## 2024-05-24 PROCEDURE — 85027 COMPLETE CBC AUTOMATED: CPT | Performed by: STUDENT IN AN ORGANIZED HEALTH CARE EDUCATION/TRAINING PROGRAM

## 2024-05-24 PROCEDURE — 58300 INSERT INTRAUTERINE DEVICE: CPT | Performed by: STUDENT IN AN ORGANIZED HEALTH CARE EDUCATION/TRAINING PROGRAM

## 2024-05-24 PROCEDURE — 2500000004 HC RX 250 GENERAL PHARMACY W/ HCPCS (ALT 636 FOR OP/ED): Performed by: STUDENT IN AN ORGANIZED HEALTH CARE EDUCATION/TRAINING PROGRAM

## 2024-05-24 PROCEDURE — 3700000001 HC GENERAL ANESTHESIA TIME - INITIAL BASE CHARGE: Performed by: ANESTHESIOLOGY

## 2024-05-24 PROCEDURE — 86923 COMPATIBILITY TEST ELECTRIC: CPT

## 2024-05-24 PROCEDURE — 2500000004 HC RX 250 GENERAL PHARMACY W/ HCPCS (ALT 636 FOR OP/ED)

## 2024-05-24 PROCEDURE — 82947 ASSAY GLUCOSE BLOOD QUANT: CPT

## 2024-05-24 PROCEDURE — 3700000014 EPIDURAL BLOCK: Mod: GC

## 2024-05-24 PROCEDURE — 7210000002 HC LABOR PER HOUR

## 2024-05-24 PROCEDURE — 2500000005 HC RX 250 GENERAL PHARMACY W/O HCPCS

## 2024-05-24 PROCEDURE — 36415 COLL VENOUS BLD VENIPUNCTURE: CPT | Performed by: STUDENT IN AN ORGANIZED HEALTH CARE EDUCATION/TRAINING PROGRAM

## 2024-05-24 PROCEDURE — 3E033VJ INTRODUCTION OF OTHER HORMONE INTO PERIPHERAL VEIN, PERCUTANEOUS APPROACH: ICD-10-PCS | Performed by: NURSE PRACTITIONER

## 2024-05-24 PROCEDURE — 3700000002 HC GENERAL ANESTHESIA TIME - EACH INCREMENTAL 1 MINUTE: Performed by: ANESTHESIOLOGY

## 2024-05-24 PROCEDURE — 86901 BLOOD TYPING SEROLOGIC RH(D): CPT | Performed by: STUDENT IN AN ORGANIZED HEALTH CARE EDUCATION/TRAINING PROGRAM

## 2024-05-24 PROCEDURE — 01967 NEURAXL LBR ANES VAG DLVR: CPT | Performed by: ANESTHESIOLOGY

## 2024-05-24 PROCEDURE — 10907ZC DRAINAGE OF AMNIOTIC FLUID, THERAPEUTIC FROM PRODUCTS OF CONCEPTION, VIA NATURAL OR ARTIFICIAL OPENING: ICD-10-PCS | Performed by: NURSE PRACTITIONER

## 2024-05-24 PROCEDURE — 86780 TREPONEMA PALLIDUM: CPT | Performed by: STUDENT IN AN ORGANIZED HEALTH CARE EDUCATION/TRAINING PROGRAM

## 2024-05-24 RX ORDER — HYDRALAZINE HYDROCHLORIDE 20 MG/ML
5 INJECTION INTRAMUSCULAR; INTRAVENOUS ONCE AS NEEDED
Status: DISCONTINUED | OUTPATIENT
Start: 2024-05-24 | End: 2024-05-25 | Stop reason: HOSPADM

## 2024-05-24 RX ORDER — METOCLOPRAMIDE 10 MG/1
10 TABLET ORAL EVERY 6 HOURS PRN
Status: DISCONTINUED | OUTPATIENT
Start: 2024-05-24 | End: 2024-05-25

## 2024-05-24 RX ORDER — DEXTROSE 40 %
30 GEL (GRAM) ORAL
Status: DISCONTINUED | OUTPATIENT
Start: 2024-05-24 | End: 2024-05-25

## 2024-05-24 RX ORDER — OXYTOCIN 10 [USP'U]/ML
10 INJECTION, SOLUTION INTRAMUSCULAR; INTRAVENOUS ONCE AS NEEDED
Status: DISCONTINUED | OUTPATIENT
Start: 2024-05-24 | End: 2024-05-25 | Stop reason: HOSPADM

## 2024-05-24 RX ORDER — ONDANSETRON 4 MG/1
4 TABLET, FILM COATED ORAL EVERY 6 HOURS PRN
Status: DISCONTINUED | OUTPATIENT
Start: 2024-05-24 | End: 2024-05-25

## 2024-05-24 RX ORDER — LOPERAMIDE HYDROCHLORIDE 2 MG/1
4 CAPSULE ORAL EVERY 2 HOUR PRN
Status: DISCONTINUED | OUTPATIENT
Start: 2024-05-24 | End: 2024-05-25 | Stop reason: HOSPADM

## 2024-05-24 RX ORDER — SODIUM CHLORIDE, SODIUM LACTATE, POTASSIUM CHLORIDE, CALCIUM CHLORIDE 600; 310; 30; 20 MG/100ML; MG/100ML; MG/100ML; MG/100ML
125 INJECTION, SOLUTION INTRAVENOUS CONTINUOUS
Status: DISCONTINUED | OUTPATIENT
Start: 2024-05-24 | End: 2024-05-25

## 2024-05-24 RX ORDER — INSULIN LISPRO 100 [IU]/ML
0-10 INJECTION, SOLUTION INTRAVENOUS; SUBCUTANEOUS EVERY 4 HOURS
Status: DISCONTINUED | OUTPATIENT
Start: 2024-05-24 | End: 2024-05-25

## 2024-05-24 RX ORDER — LABETALOL HYDROCHLORIDE 5 MG/ML
20 INJECTION, SOLUTION INTRAVENOUS ONCE AS NEEDED
Status: DISCONTINUED | OUTPATIENT
Start: 2024-05-24 | End: 2024-05-25 | Stop reason: HOSPADM

## 2024-05-24 RX ORDER — ONDANSETRON HYDROCHLORIDE 2 MG/ML
4 INJECTION, SOLUTION INTRAVENOUS EVERY 6 HOURS PRN
Status: DISCONTINUED | OUTPATIENT
Start: 2024-05-24 | End: 2024-05-25

## 2024-05-24 RX ORDER — CARBOPROST TROMETHAMINE 250 UG/ML
250 INJECTION, SOLUTION INTRAMUSCULAR ONCE AS NEEDED
Status: DISCONTINUED | OUTPATIENT
Start: 2024-05-24 | End: 2024-05-25 | Stop reason: HOSPADM

## 2024-05-24 RX ORDER — METOCLOPRAMIDE HYDROCHLORIDE 5 MG/ML
10 INJECTION INTRAMUSCULAR; INTRAVENOUS EVERY 6 HOURS PRN
Status: DISCONTINUED | OUTPATIENT
Start: 2024-05-24 | End: 2024-05-25

## 2024-05-24 RX ORDER — FENTANYL/BUPIVACAINE/NS/PF 2MCG/ML-.1
PLASTIC BAG, INJECTION (ML) INJECTION AS NEEDED
Status: DISCONTINUED | OUTPATIENT
Start: 2024-05-24 | End: 2024-05-25

## 2024-05-24 RX ORDER — DEXTROSE 40 %
15 GEL (GRAM) ORAL
Status: DISCONTINUED | OUTPATIENT
Start: 2024-05-24 | End: 2024-05-25

## 2024-05-24 RX ORDER — TERBUTALINE SULFATE 1 MG/ML
0.25 INJECTION SUBCUTANEOUS ONCE AS NEEDED
Status: DISCONTINUED | OUTPATIENT
Start: 2024-05-24 | End: 2024-05-25 | Stop reason: HOSPADM

## 2024-05-24 RX ORDER — TRANEXAMIC ACID 100 MG/ML
1000 INJECTION, SOLUTION INTRAVENOUS ONCE AS NEEDED
Status: DISCONTINUED | OUTPATIENT
Start: 2024-05-24 | End: 2024-05-25 | Stop reason: HOSPADM

## 2024-05-24 RX ORDER — NIFEDIPINE 10 MG/1
10 CAPSULE ORAL ONCE AS NEEDED
Status: DISCONTINUED | OUTPATIENT
Start: 2024-05-24 | End: 2024-05-25 | Stop reason: HOSPADM

## 2024-05-24 RX ORDER — DEXTROSE 50 % IN WATER (D50W) INTRAVENOUS SYRINGE
25
Status: DISCONTINUED | OUTPATIENT
Start: 2024-05-24 | End: 2024-05-25

## 2024-05-24 RX ORDER — MISOPROSTOL 200 UG/1
800 TABLET ORAL ONCE AS NEEDED
Status: COMPLETED | OUTPATIENT
Start: 2024-05-24 | End: 2024-05-25

## 2024-05-24 RX ORDER — METHYLERGONOVINE MALEATE 0.2 MG/ML
0.2 INJECTION INTRAVENOUS ONCE AS NEEDED
Status: DISCONTINUED | OUTPATIENT
Start: 2024-05-24 | End: 2024-05-25 | Stop reason: HOSPADM

## 2024-05-24 RX ORDER — OXYTOCIN/0.9 % SODIUM CHLORIDE 30/500 ML
60 PLASTIC BAG, INJECTION (ML) INTRAVENOUS ONCE AS NEEDED
Status: DISCONTINUED | OUTPATIENT
Start: 2024-05-24 | End: 2024-05-25 | Stop reason: HOSPADM

## 2024-05-24 RX ORDER — OXYTOCIN/0.9 % SODIUM CHLORIDE 30/500 ML
2-30 PLASTIC BAG, INJECTION (ML) INTRAVENOUS CONTINUOUS
Status: DISCONTINUED | OUTPATIENT
Start: 2024-05-24 | End: 2024-05-25

## 2024-05-24 RX ORDER — LIDOCAINE HYDROCHLORIDE 10 MG/ML
30 INJECTION INFILTRATION; PERINEURAL ONCE AS NEEDED
Status: DISCONTINUED | OUTPATIENT
Start: 2024-05-24 | End: 2024-05-25 | Stop reason: HOSPADM

## 2024-05-24 RX ADMIN — FENTANYL CITRATE 2 ML: 50 INJECTION INTRAMUSCULAR; INTRAVENOUS at 22:51

## 2024-05-24 RX ADMIN — FENTANYL CITRATE 3 ML: 50 INJECTION INTRAMUSCULAR; INTRAVENOUS at 22:49

## 2024-05-24 RX ADMIN — Medication 2 MILLI-UNITS/MIN: at 19:30

## 2024-05-24 RX ADMIN — SODIUM CHLORIDE, POTASSIUM CHLORIDE, SODIUM LACTATE AND CALCIUM CHLORIDE 125 ML/HR: 600; 310; 30; 20 INJECTION, SOLUTION INTRAVENOUS at 18:02

## 2024-05-24 RX ADMIN — SODIUM CHLORIDE, POTASSIUM CHLORIDE, SODIUM LACTATE AND CALCIUM CHLORIDE 125 ML/HR: 600; 310; 30; 20 INJECTION, SOLUTION INTRAVENOUS at 22:48

## 2024-05-24 RX ADMIN — EPINEPHRINE 14 ML/HR: 1 INJECTION, SOLUTION, CONCENTRATE INTRAVENOUS at 22:53

## 2024-05-24 RX ADMIN — FENTANYL CITRATE 5 ML: 50 INJECTION INTRAMUSCULAR; INTRAVENOUS at 22:45

## 2024-05-24 RX ADMIN — SODIUM CHLORIDE, POTASSIUM CHLORIDE, SODIUM LACTATE AND CALCIUM CHLORIDE 500 ML: 600; 310; 30; 20 INJECTION, SOLUTION INTRAVENOUS at 22:15

## 2024-05-24 SDOH — SOCIAL STABILITY: SOCIAL INSECURITY: HAS ANYONE EVER THREATENED TO HURT YOUR FAMILY OR YOUR PETS?: NO

## 2024-05-24 SDOH — SOCIAL STABILITY: SOCIAL INSECURITY: ARE THERE ANY APPARENT SIGNS OF INJURIES/BEHAVIORS THAT COULD BE RELATED TO ABUSE/NEGLECT?: NO

## 2024-05-24 SDOH — SOCIAL STABILITY: SOCIAL INSECURITY: PHYSICAL ABUSE: DENIES

## 2024-05-24 SDOH — ECONOMIC STABILITY: HOUSING INSECURITY: DO YOU FEEL UNSAFE GOING BACK TO THE PLACE WHERE YOU ARE LIVING?: NO

## 2024-05-24 SDOH — HEALTH STABILITY: MENTAL HEALTH: SUICIDAL BEHAVIOR (LIFETIME): NO

## 2024-05-24 SDOH — HEALTH STABILITY: MENTAL HEALTH: HAVE YOU USED ANY PRESCRIPTION DRUGS OTHER THAN PRESCRIBED IN THE PAST 12 MONTHS?: NO

## 2024-05-24 SDOH — SOCIAL STABILITY: SOCIAL INSECURITY: ARE YOU OR HAVE YOU BEEN THREATENED OR ABUSED PHYSICALLY, EMOTIONALLY, OR SEXUALLY BY ANYONE?: NO

## 2024-05-24 SDOH — HEALTH STABILITY: MENTAL HEALTH: HAVE YOU USED ANY SUBSTANCES (CANABIS, COCAINE, HEROIN, HALLUCINOGENS, INHALANTS, ETC.) IN THE PAST 12 MONTHS?: NO

## 2024-05-24 SDOH — HEALTH STABILITY: MENTAL HEALTH: NON-SPECIFIC ACTIVE SUICIDAL THOUGHTS (PAST 1 MONTH): NO

## 2024-05-24 SDOH — SOCIAL STABILITY: SOCIAL INSECURITY: HAVE YOU HAD THOUGHTS OF HARMING ANYONE ELSE?: NO

## 2024-05-24 SDOH — SOCIAL STABILITY: SOCIAL INSECURITY: DOES ANYONE TRY TO KEEP YOU FROM HAVING/CONTACTING OTHER FRIENDS OR DOING THINGS OUTSIDE YOUR HOME?: NO

## 2024-05-24 SDOH — HEALTH STABILITY: MENTAL HEALTH: WERE YOU ABLE TO COMPLETE ALL THE BEHAVIORAL HEALTH SCREENINGS?: YES

## 2024-05-24 SDOH — SOCIAL STABILITY: SOCIAL INSECURITY: VERBAL ABUSE: DENIES

## 2024-05-24 SDOH — HEALTH STABILITY: MENTAL HEALTH: WISH TO BE DEAD (PAST 1 MONTH): NO

## 2024-05-24 SDOH — HEALTH STABILITY: MENTAL HEALTH: CURRENT SMOKER: 0

## 2024-05-24 SDOH — SOCIAL STABILITY: SOCIAL INSECURITY: DO YOU FEEL ANYONE HAS EXPLOITED OR TAKEN ADVANTAGE OF YOU FINANCIALLY OR OF YOUR PERSONAL PROPERTY?: NO

## 2024-05-24 SDOH — SOCIAL STABILITY: SOCIAL INSECURITY: HAVE YOU HAD ANY THOUGHTS OF HARMING ANYONE ELSE?: NO

## 2024-05-24 SDOH — SOCIAL STABILITY: SOCIAL INSECURITY: ABUSE SCREEN: ADULT

## 2024-05-24 ASSESSMENT — PATIENT HEALTH QUESTIONNAIRE - PHQ9
2. FEELING DOWN, DEPRESSED OR HOPELESS: MORE THAN HALF THE DAYS
SUM OF ALL RESPONSES TO PHQ9 QUESTIONS 1 & 2: 5
1. LITTLE INTEREST OR PLEASURE IN DOING THINGS: NEARLY EVERY DAY

## 2024-05-24 ASSESSMENT — LIFESTYLE VARIABLES
HOW OFTEN DO YOU HAVE A DRINK CONTAINING ALCOHOL: NEVER
HOW OFTEN DO YOU HAVE 6 OR MORE DRINKS ON ONE OCCASION: NEVER
AUDIT-C TOTAL SCORE: 0
AUDIT-C TOTAL SCORE: 0
SKIP TO QUESTIONS 9-10: 1
HOW MANY STANDARD DRINKS CONTAINING ALCOHOL DO YOU HAVE ON A TYPICAL DAY: PATIENT DOES NOT DRINK

## 2024-05-24 ASSESSMENT — PAIN SCALES - GENERAL: PAINLEVEL_OUTOF10: 0 - NO PAIN

## 2024-05-24 NOTE — ANESTHESIA PREPROCEDURE EVALUATION
Patient: Jing Menjivar    Evaluation Method: In-person visit    Procedure Information    Date: 05/24/24  Procedure: Labor Consult         Relevant Problems   Anesthesia (within normal limits)      Cardiac (within normal limits)   (-) History of coronary artery bypass graft      Pulmonary   (+) Moderate persistent asthma without complication ([facility])      Neuro   (+) Depression affecting pregnancy in first trimester, antepartum ([facility])      GI (within normal limits)      /Renal (within normal limits)      Liver (within normal limits)      Endocrine (within normal limits)      Hematology   (+) Anemia during pregnancy in third trimester ([facility])      Musculoskeletal (within normal limits)      HEENT (within normal limits)      ID (within normal limits)      Skin (within normal limits)      GYN   (+) 37 weeks gestation of pregnancy ([facility])   (+) Supervision of high risk pregnancy in third trimester ([facility])       Clinical information reviewed:   Tobacco  Allergies    Med Hx  Surg Hx   Fam Hx          NPO Detail:  NPO/Void Status  Date of Last Liquid: 05/24/24  Time of Last Liquid: 0930  Date of Last Solid: 05/24/24  Time of Last Solid: 0930         OB/Gyn Evaluation    Present Pregnancy    Patient is pregnant now.   Obstetric History                Physical Exam    Airway  Mallampati: I  TM distance: >3 FB  Neck ROM: full     Cardiovascular - normal exam     Dental - normal exam     Pulmonary - normal exam     Abdominal - normal exam             Anesthesia Plan    History of general anesthesia?: yes  History of complications of general anesthesia?: no    ASA 2     epidural     The patient is not a current smoker.  Patient was not previously instructed to abstain from smoking on day of procedure.  Patient did not smoke on day of procedure.    Anesthetic plan and risks discussed with patient.    Plan discussed with attending.

## 2024-05-24 NOTE — PROGRESS NOTES
Obstetrical Admission History and Physical    Assessment/Plan    Jing Menjivar is a 33 y.o.  at 39w0d admitted for term IOL    IOL  Admitted, consented, scanned, cephalic  Cervix favorable, will induce with pitocin and AROM  Epidural as requested  GBS negative  [place] to EFW 7#  CEFM, currently Cat I  Delivery plan: patient desires vaginal delivery, patient counseled on risks of labor, vaginal delivery, and possibility of C/S for varying maternal or fetal indications    Absent GDM screening  For q4hr glucose checks w/SSI  Hemoglobin A1c 5.8  Admission glucose 101    Postpartum care  Desires ppIUD, added to consent, will place after delivery    Asthma  No medications, avoid hemabate    Anemia  Continue PO iron  Admission CBC pending  T&C 1u pRBC    Seen and discussed with Dr. Oswaldo Rodarte MD  OBGYN    Subjective   Patient feels well overall. Denies contractions, VB, LOF.    Pregnancy notables:      Problem List       Class 1 obesity    37 weeks gestation of pregnancy ([facility])    Overview Addendum 2024 10:52 AM by Irina Cast MD     Dating: LMP cw 9w us   [x] Initial BMI: 35  [x] Prenatal Labs:   [x] Genetic Screening:    [] Baby ASA:  [x] Anatomy US:  [] 1hr GCT at 24-28wks: drank 5/3 - blood not drawn  [x] Tdap (27-36wks):   [] Flu Shot: declined  [] COVID vaccine: declined  [x] Rhogam (if Rh neg): n/a  [x] GBS at 36 wks: collected /3  [x] Breastfeeding pump requested   [x] Postpartum Birth control method: ppIUD  [x] 39 weeks discussion of IOL vs. Expectant management: desires 39w IOL, requested date   [x] Mode of delivery:  anticipate vaginal          Depression affecting pregnancy in first trimester, antepartum ([facility])    Overview Addendum 2024  3:35 PM by TERA Oneil-VIOLETTE     H/o depression in previous pregnancies, previously on Wellbutrin and followed with therapist     Was previously on risperidone with last pregnancy- says happens every  pregnancy and gets worse postpartum         Moderate persistent asthma without complication ([facility])    Overview Signed 5/3/2024 10:29 AM by Bharti Crenshaw MD     No current inhaler use         Small head circumference    Overview Addendum 5/3/2024 10:39 AM by Bharti Crenshaw MD     Patient offered genetic testing- declined   Repeat US eval 5/3 - wnl         Encounter for maternal care for suspected poor fetal growth in valadez pregnancy in third trimester ([facility])    Overview Addendum 5/3/2024 10:39 AM by Bharti Crenshaw MD     S<D on   Growth US 5/3 - normal growth pattern         Anemia during pregnancy in third trimester ([facility])    Overview Addendum 5/3/2024 10:34 AM by Bharti Crenshaw MD     Hgb 9.5 , PO Fe sent . Scheduled for IV iron infusion on .         Supervision of high risk pregnancy in third trimester ([facility])    Limited prenatal care in third trimester ([facility])    Overview Signed 2024 10:54 AM by Irina Cast MD     Only 4 prenatal visits               Obstetrical History   Denies prior complications with pregnancies. Largest baby was 8#, this baby feels smaller.  OB History    Para Term  AB Living   6 3 3 0 2 3   SAB IAB Ectopic Multiple Live Births     2 0 0 3      # Outcome Date GA Lbr Gustavo/2nd Weight Sex Delivery Anes PTL Lv   6 Current            5 Term            4 Term            3 Term            2 IAB            1 IAB                Past Medical History  Past Medical History:   Diagnosis Date    31 weeks gestation of pregnancy ([facility]) 10/21/2021    31 weeks gestation of pregnancy    Encounter for  screening, unspecified ([facility]) 2021    Encounter for fetal ultrasound    Encounter for immunization 2018    Need for Tdap vaccination    Encounter for pregnancy test, result positive ([facility]) 2014    Positive pregnancy test    Encounter for supervision of normal first pregnancy, unspecified trimester ([facility]) 2015     Pregnancy, first    Personal history of diseases of the skin and subcutaneous tissue     History of eczema    Personal history of other diseases of the respiratory system 12/05/2014    History of asthma    Personal history of other drug therapy 10/21/2021    History of influenza vaccination        Past Surgical History   Past Surgical History:   Procedure Laterality Date    OTHER SURGICAL HISTORY  06/29/2021    Dilation and curettage    OTHER SURGICAL HISTORY  04/01/2015    Prior Surgical Procedure Not Done       Social History  Social History     Tobacco Use    Smoking status: Never    Smokeless tobacco: Never   Substance Use Topics    Alcohol use: Not Currently     Substance and Sexual Activity   Drug Use Never       Allergies  Patient has no known allergies.     Medications  Ferrous sulfate    Objective    Last Vitals  Temp Pulse Resp BP MAP O2 Sat   37.2 °C (99 °F) 89 16 122/64   99 %     Physical Examination  General: no acute distress  HEENT: normocephalic, atraumatic  Heart: warm and well perfused  Lungs: breathing comfortably on room air  Abdomen: gravid  Extremities: moving all extremities  Neuro: awake and conversant  Psych: appropriate mood and affect    Cervical Exam  Dilation: 3  Effacement (%): 50  Fetal Station: -3  Presentation: Vertex  Method: Manual  OB Examiner: Jamie CHAVIRA   FHT: 150/mod/+accel/-decel  [place]: irregular    Lab Review  Labs in chart were reviewed.

## 2024-05-25 PROCEDURE — 2500000001 HC RX 250 WO HCPCS SELF ADMINISTERED DRUGS (ALT 637 FOR MEDICARE OP): Performed by: STUDENT IN AN ORGANIZED HEALTH CARE EDUCATION/TRAINING PROGRAM

## 2024-05-25 PROCEDURE — 1100000001 HC PRIVATE ROOM DAILY

## 2024-05-25 PROCEDURE — 59409 OBSTETRICAL CARE: CPT | Performed by: STUDENT IN AN ORGANIZED HEALTH CARE EDUCATION/TRAINING PROGRAM

## 2024-05-25 PROCEDURE — 0UH97HZ INSERTION OF CONTRACEPTIVE DEVICE INTO UTERUS, VIA NATURAL OR ARTIFICIAL OPENING: ICD-10-PCS | Performed by: STUDENT IN AN ORGANIZED HEALTH CARE EDUCATION/TRAINING PROGRAM

## 2024-05-25 PROCEDURE — 2500000002 HC RX 250 W HCPCS SELF ADMINISTERED DRUGS (ALT 637 FOR MEDICARE OP, ALT 636 FOR OP/ED): Performed by: STUDENT IN AN ORGANIZED HEALTH CARE EDUCATION/TRAINING PROGRAM

## 2024-05-25 PROCEDURE — 7100000016 HC LABOR RECOVERY PER HOUR

## 2024-05-25 RX ORDER — LOPERAMIDE HYDROCHLORIDE 2 MG/1
4 CAPSULE ORAL EVERY 2 HOUR PRN
Status: DISCONTINUED | OUTPATIENT
Start: 2024-05-25 | End: 2024-05-26 | Stop reason: HOSPADM

## 2024-05-25 RX ORDER — MISOPROSTOL 200 UG/1
800 TABLET ORAL ONCE AS NEEDED
Status: DISCONTINUED | OUTPATIENT
Start: 2024-05-25 | End: 2024-05-26 | Stop reason: HOSPADM

## 2024-05-25 RX ORDER — ONDANSETRON 4 MG/1
4 TABLET, FILM COATED ORAL EVERY 6 HOURS PRN
Status: DISCONTINUED | OUTPATIENT
Start: 2024-05-25 | End: 2024-05-26 | Stop reason: HOSPADM

## 2024-05-25 RX ORDER — HYDRALAZINE HYDROCHLORIDE 20 MG/ML
5 INJECTION INTRAMUSCULAR; INTRAVENOUS ONCE AS NEEDED
Status: DISCONTINUED | OUTPATIENT
Start: 2024-05-25 | End: 2024-05-26 | Stop reason: HOSPADM

## 2024-05-25 RX ORDER — METHYLERGONOVINE MALEATE 0.2 MG/ML
0.2 INJECTION INTRAVENOUS ONCE AS NEEDED
Status: DISCONTINUED | OUTPATIENT
Start: 2024-05-25 | End: 2024-05-26 | Stop reason: HOSPADM

## 2024-05-25 RX ORDER — OXYTOCIN 10 [USP'U]/ML
10 INJECTION, SOLUTION INTRAMUSCULAR; INTRAVENOUS ONCE AS NEEDED
Status: DISCONTINUED | OUTPATIENT
Start: 2024-05-25 | End: 2024-05-26 | Stop reason: HOSPADM

## 2024-05-25 RX ORDER — TRANEXAMIC ACID 100 MG/ML
1000 INJECTION, SOLUTION INTRAVENOUS ONCE AS NEEDED
Status: DISCONTINUED | OUTPATIENT
Start: 2024-05-25 | End: 2024-05-26 | Stop reason: HOSPADM

## 2024-05-25 RX ORDER — BISACODYL 10 MG/1
10 SUPPOSITORY RECTAL DAILY PRN
Status: DISCONTINUED | OUTPATIENT
Start: 2024-05-25 | End: 2024-05-26 | Stop reason: HOSPADM

## 2024-05-25 RX ORDER — FENTANYL CITRATE 50 UG/ML
INJECTION, SOLUTION INTRAMUSCULAR; INTRAVENOUS CONTINUOUS PRN
Status: DISCONTINUED | OUTPATIENT
Start: 2024-05-25 | End: 2024-05-25

## 2024-05-25 RX ORDER — NIFEDIPINE 10 MG/1
10 CAPSULE ORAL ONCE AS NEEDED
Status: DISCONTINUED | OUTPATIENT
Start: 2024-05-25 | End: 2024-05-26 | Stop reason: HOSPADM

## 2024-05-25 RX ORDER — IBUPROFEN 600 MG/1
600 TABLET ORAL EVERY 6 HOURS
Status: DISCONTINUED | OUTPATIENT
Start: 2024-05-25 | End: 2024-05-26 | Stop reason: HOSPADM

## 2024-05-25 RX ORDER — SIMETHICONE 80 MG
80 TABLET,CHEWABLE ORAL 4 TIMES DAILY PRN
Status: DISCONTINUED | OUTPATIENT
Start: 2024-05-25 | End: 2024-05-26 | Stop reason: HOSPADM

## 2024-05-25 RX ORDER — DIPHENHYDRAMINE HCL 25 MG
25 CAPSULE ORAL EVERY 6 HOURS PRN
Status: DISCONTINUED | OUTPATIENT
Start: 2024-05-25 | End: 2024-05-26 | Stop reason: HOSPADM

## 2024-05-25 RX ORDER — LABETALOL HYDROCHLORIDE 5 MG/ML
20 INJECTION, SOLUTION INTRAVENOUS ONCE AS NEEDED
Status: DISCONTINUED | OUTPATIENT
Start: 2024-05-25 | End: 2024-05-26 | Stop reason: HOSPADM

## 2024-05-25 RX ORDER — LIDOCAINE 560 MG/1
1 PATCH PERCUTANEOUS; TOPICAL; TRANSDERMAL
Status: DISCONTINUED | OUTPATIENT
Start: 2024-05-25 | End: 2024-05-26 | Stop reason: HOSPADM

## 2024-05-25 RX ORDER — POLYETHYLENE GLYCOL 3350 17 G/17G
17 POWDER, FOR SOLUTION ORAL 2 TIMES DAILY PRN
Status: DISCONTINUED | OUTPATIENT
Start: 2024-05-25 | End: 2024-05-26 | Stop reason: HOSPADM

## 2024-05-25 RX ORDER — ACETAMINOPHEN 325 MG/1
975 TABLET ORAL EVERY 6 HOURS
Status: DISCONTINUED | OUTPATIENT
Start: 2024-05-25 | End: 2024-05-26 | Stop reason: HOSPADM

## 2024-05-25 RX ORDER — ONDANSETRON HYDROCHLORIDE 2 MG/ML
4 INJECTION, SOLUTION INTRAVENOUS EVERY 6 HOURS PRN
Status: DISCONTINUED | OUTPATIENT
Start: 2024-05-25 | End: 2024-05-26 | Stop reason: HOSPADM

## 2024-05-25 RX ORDER — DIPHENHYDRAMINE HYDROCHLORIDE 50 MG/ML
25 INJECTION INTRAMUSCULAR; INTRAVENOUS EVERY 6 HOURS PRN
Status: DISCONTINUED | OUTPATIENT
Start: 2024-05-25 | End: 2024-05-26 | Stop reason: HOSPADM

## 2024-05-25 RX ORDER — OXYTOCIN/0.9 % SODIUM CHLORIDE 30/500 ML
60 PLASTIC BAG, INJECTION (ML) INTRAVENOUS ONCE AS NEEDED
Status: DISCONTINUED | OUTPATIENT
Start: 2024-05-25 | End: 2024-05-26 | Stop reason: HOSPADM

## 2024-05-25 RX ORDER — CARBOPROST TROMETHAMINE 250 UG/ML
250 INJECTION, SOLUTION INTRAMUSCULAR ONCE AS NEEDED
Status: DISCONTINUED | OUTPATIENT
Start: 2024-05-25 | End: 2024-05-26 | Stop reason: HOSPADM

## 2024-05-25 RX ORDER — ADHESIVE BANDAGE
10 BANDAGE TOPICAL
Status: DISCONTINUED | OUTPATIENT
Start: 2024-05-25 | End: 2024-05-26 | Stop reason: HOSPADM

## 2024-05-25 RX ADMIN — MISOPROSTOL 800 MCG: 200 TABLET ORAL at 00:33

## 2024-05-25 RX ADMIN — ACETAMINOPHEN 975 MG: 325 TABLET ORAL at 08:33

## 2024-05-25 RX ADMIN — IBUPROFEN 600 MG: 600 TABLET, FILM COATED ORAL at 08:33

## 2024-05-25 RX ADMIN — IBUPROFEN 600 MG: 600 TABLET, FILM COATED ORAL at 16:40

## 2024-05-25 RX ADMIN — ACETAMINOPHEN 975 MG: 325 TABLET ORAL at 02:08

## 2024-05-25 RX ADMIN — IBUPROFEN 600 MG: 600 TABLET, FILM COATED ORAL at 02:08

## 2024-05-25 RX ADMIN — ACETAMINOPHEN 975 MG: 325 TABLET ORAL at 16:40

## 2024-05-25 RX ADMIN — IBUPROFEN 600 MG: 600 TABLET, FILM COATED ORAL at 22:51

## 2024-05-25 RX ADMIN — ACETAMINOPHEN 975 MG: 325 TABLET ORAL at 22:51

## 2024-05-25 ASSESSMENT — PAIN SCALES - GENERAL
PAINLEVEL_OUTOF10: 0 - NO PAIN
PAIN_LEVEL: 0
PAINLEVEL_OUTOF10: 0 - NO PAIN

## 2024-05-25 NOTE — ANESTHESIA PREPROCEDURE EVALUATION
Patient: Jing Menjivar    Evaluation Method: In-person visitPatient: Jing Menjivar    Evaluation Method: In-person visit    Procedure Information    Date: 05/24/24  Procedure: Labor Consult         Relevant Problems   Anesthesia (within normal limits)      Cardiac (within normal limits)   (-) History of coronary artery bypass graft      Pulmonary   (+) Moderate persistent asthma without complication ([facility])      Neuro   (+) Depression affecting pregnancy in first trimester, antepartum ([facility])      GI (within normal limits)      /Renal (within normal limits)      Liver (within normal limits)      Endocrine (within normal limits)      Hematology   (+) Anemia during pregnancy in third trimester ([facility])      Musculoskeletal (within normal limits)      HEENT (within normal limits)      ID (within normal limits)      Skin (within normal limits)      GYN   (+) 37 weeks gestation of pregnancy ([facility])   (+) Supervision of high risk pregnancy in third trimester ([facility])       Clinical information reviewed:   Tobacco  Allergies    Med Hx  Surg Hx   Fam Hx          NPO Detail:  NPO/Void Status  Date of Last Liquid: 05/24/24  Time of Last Liquid: 0930  Date of Last Solid: 05/24/24  Time of Last Solid: 0930         OB/Gyn Evaluation    Present Pregnancy    Patient is pregnant now.   Obstetric History                Physical Exam    Airway  Mallampati: I  TM distance: >3 FB  Neck ROM: full     Cardiovascular - normal exam     Dental - normal exam     Pulmonary - normal exam     Abdominal - normal exam             Anesthesia Plan    History of general anesthesia?: yes  History of complications of general anesthesia?: no    ASA 2     epidural     The patient is not a current smoker.  Patient was not previously instructed to abstain from smoking on day of procedure.  Patient did not smoke on day of procedure.    Anesthetic plan and risks discussed with patient.    Plan discussed with  attending.        Procedure Information    Date: 05/24/24  Procedure: Labor Analgesia         Relevant Problems   Pulmonary   (+) Moderate persistent asthma without complication ([facility])      Neuro   (+) Depression affecting pregnancy in first trimester, antepartum ([facility])      Hematology   (+) Anemia during pregnancy in third trimester ([facility])      GYN   (+) 37 weeks gestation of pregnancy ([facility])   (+) Supervision of high risk pregnancy in third trimester ([facility])       Clinical information reviewed:   Tobacco  Allergies    Med Hx  Surg Hx   Fam Hx          NPO Detail:  NPO/Void Status  Date of Last Liquid: 05/24/24  Time of Last Liquid: 0930  Date of Last Solid: 05/24/24  Time of Last Solid: 0930         OB/Gyn Evaluation    Present Pregnancy    Patient is pregnant now.   Obstetric History                Physical Exam    Airway  Mallampati: I  TM distance: >3 FB  Neck ROM: full     Cardiovascular   Rhythm: regular  Rate: normal     Dental - normal exam     Pulmonary - normal exam     Abdominal - normal exam             Anesthesia Plan    History of general anesthesia?: yes  History of complications of general anesthesia?: no    ASA 2     epidural     The patient is not a current smoker.  Patient was not previously instructed to abstain from smoking on day of procedure.  Patient did not smoke on day of procedure.    Anesthetic plan and risks discussed with patient.    Plan discussed with attending.

## 2024-05-25 NOTE — ANESTHESIA PROCEDURE NOTES
Epidural Block    Patient location during procedure: OR  Start time: 5/24/2024 10:34 PM  End time: 5/24/2024 10:54 PM  Reason for block: primary anesthetic  Staffing  Performed: resident   Authorized by: Priscilla Gallagher MD    Performed by: Jasmina Sainz MD    Preanesthetic Checklist  Completed: patient identified, IV checked, risks and benefits discussed, surgical consent, pre-op evaluation, timeout performed and sterile techniques followed  Block Timeout  RN/Licensed healthcare professional reads aloud to the Anesthesia provider and entire team: Patient identity, procedure with side and site, patient position, and as applicable the availability of implants/special equipment/special requirements.  Patient on coagulant treatment: no  Timeout performed at: 5/24/2024 10:30 PM  Block Placement  Patient position: sitting  Prep: ChloraPrep  Sterility prep: cap, drape, gloves, hand and mask  Sedation level: no sedation  Patient monitoring: blood pressure, heart rate and continuous pulse oximetry  Approach: midline  Local numbing: lidocaine 1% to skin and subcutaneous tissues  Vertebral space: lumbar  Lumbar location: L3-L4  Epidural  Loss of resistance technique: saline  Guidance: landmark technique        Needle  Needle type: Tuohy   Needle gauge: 17  Needle length: 10 cm  Needle insertion depth: 5 cm  Catheter type: multi-orifice  Catheter size: 19 G  Catheter at skin depth: 10 cm  Catheter securement method: clear occlusive dressing and liquid medical adhesive    Test dose: lidocaine 1.5% with epinephrine 1-to-200,000  Test dose: lidocaine 1.5% with epinephrine 1-to-200,000  Test dose result: no positive test dose    PCEA  Medication concentration used: 0.044% Bupivacaine with 1.25 mcg/mL Fentanyl and 1:[id number] Epinephrine  Dose (mL): 10  Lockout (minutes): 15  1-Hour Limit (boluses/hr): 4  Basal Rate: 14        Assessment  Sensory level: T10 bilateral  Block outcome: pain improved  Number of attempts: 1  Events: no  positive test dose  Procedure assessment: patient tolerated procedure well with no immediate complications

## 2024-05-25 NOTE — SIGNIFICANT EVENT
"Labor Progress Note    Subjective: Patient breathing through contractions    Objective:  Vitals: /73   Pulse 93   Temp 36.8 °C (98.2 °F) (Oral)   Resp 20   Ht 1.651 m (5' 5\")   Wt 100 kg (220 lb 7.4 oz)   LMP 08/25/2023 (Exact Date)   SpO2 99%   BMI 36.69 kg/m²   Cervical Exam  Dilation: 4  Effacement (%): 50  Fetal Station: -3  AROM clear fluid  FHT: 130/mod/+accel/-decel  [place]: q4min    IOL  Continue pitocin per protocol  CEFM, currently Category I  Epidural to be requested now    Sophie Davis MD  OB/GYN    "

## 2024-05-25 NOTE — L&D DELIVERY NOTE
OB Delivery Note  2024  Jing Menjivar  33 y.o.   Vaginal, Spontaneous        Gestational Age: 39w1d  /Para:   Quantitative Blood Loss: Admission to Discharge: 200 mL (2024  5:37 PM - 2024  1:51 AM)    Savi Menjivar [[id number]]      Labor Events    Rupture date/time: 2024  Rupture type: Artificial  Fluid color: Clear  Fluid odor: None  Labor type: Induced Onset of Labor  Labor allowed to proceed with plans for an attempted vaginal birth?: Yes  Induction: Oxytocin  First cervical ripening date/time: 2024  Induction date/time: 2024  Induction indications: Risk Reducing  Complications: None       Labor Event Times    Labor onset date/time: 2024  Dilation complete date/time: 2024  Start pushing date/time: 2024       Labor Length    1st stage: 4h 45m  2nd stage: 0h 06m  3rd stage: 0h 02m       Placenta    Placenta delivery date/time: 2024  Placenta removal: Spontaneous  Placenta appearance: Intact  Placenta disposition: discarded       Cord    Vessels: 3 vessels  Complications: None  Delayed cord clamping?: Yes  Cord clamped date/time: 2024  Cord blood disposition: Discarded  Gases sent?: No  Stem cell collection (by provider): No       Lacerations    Episiotomy: None  Perineal laceration: None  Other lacerations?: No  Repair suture: None       Anesthesia    Method: Epidural       Operative Delivery    Forceps attempted?: No  Vacuum extractor attempted?: No       Shoulder Dystocia    Shoulder dystocia present?: No       [place] Delivery    Time head delivered: 2024 00:21:00  Birth date/time: 2024 00:21:23  Delivery type: Vaginal, Spontaneous  Complications: None       Resuscitation    Method: None       Apgars    Living status: Living  Apgar Component Scores:  1 min.:  5 min.:  10 min.:  15 min.:  20 min.:    Skin color:  0  1       Heart rate:  2  2       Reflex irritability:  2  2        Muscle tone:  2  2       Respiratory effort:  2  2       Total:  8  9       Apgars assigned by: ROCKY DELONG       Delivery Providers    Delivering clinician: Derek Chacon MD   Provider Role    Zara Mcfarland, RN Delivery Nurse    Brianna Mann, RN Nursery Nurse    Sophie Rodarte MD Resident    Serene Cabrera MD Resident    Marya Delong, RN Nursery Nurse               After delivery of placenta, bimanual examination was performed and 50mL of clot was expressed from the lower uterine segment. Bedside ultrasound revealed thin endometrial stripe. Mirena IUD was placed under direct US visualization to the fundus and strings were trimmed at the cervix. Patient tolerated the procedure well. 800mcg cytotec given after placement of IUD.    Sophie Rodarte MD

## 2024-05-25 NOTE — H&P
Obstetrical Admission History and Physical    Assessment/Plan    Jing Menjivar is a 33 y.o.  at 39w0d admitted for term IOL    IOL  Admitted, consented, scanned, cephalic  Cervix favorable, will induce with pitocin and AROM  Epidural as requested  GBS negative  [place] to EFW 7#  CEFM, currently Cat I  Delivery plan: patient desires vaginal delivery, patient counseled on risks of labor, vaginal delivery, and possibility of C/S for varying maternal or fetal indications    Absent GDM screening  For q4hr glucose checks w/SSI  Hemoglobin A1c 5.8  Admission glucose 101    Postpartum care  Desires ppIUD, added to consent, will place after delivery    Asthma  No medications, avoid hemabate    Anemia  Continue PO iron  Admission CBC pending  T&C 1u pRBC    Seen and discussed with Dr. Oswaldo Rodarte MD  OBGYN    Subjective   Patient feels well overall. Denies contractions, VB, LOF.    Pregnancy notables:      Problem List       Class 1 obesity    37 weeks gestation of pregnancy ([facility])    Overview Addendum 2024 10:52 AM by Irina Cast MD     Dating: LMP cw 9w us   [x] Initial BMI: 35  [x] Prenatal Labs:   [x] Genetic Screening:    [] Baby ASA:  [x] Anatomy US:  [] 1hr GCT at 24-28wks: drank 5/3 - blood not drawn  [x] Tdap (27-36wks):   [] Flu Shot: declined  [] COVID vaccine: declined  [x] Rhogam (if Rh neg): n/a  [x] GBS at 36 wks: collected /3  [x] Breastfeeding pump requested   [x] Postpartum Birth control method: ppIUD  [x] 39 weeks discussion of IOL vs. Expectant management: desires 39w IOL, requested date   [x] Mode of delivery:  anticipate vaginal          Depression affecting pregnancy in first trimester, antepartum ([facility])    Overview Addendum 2024  3:35 PM by TERA Oneil-VIOLETTE     H/o depression in previous pregnancies, previously on Wellbutrin and followed with therapist     Was previously on risperidone with last pregnancy- says happens every  pregnancy and gets worse postpartum         Moderate persistent asthma without complication ([facility])    Overview Signed 5/3/2024 10:29 AM by Bharti Crenshaw MD     No current inhaler use         Small head circumference    Overview Addendum 5/3/2024 10:39 AM by Bharti Crenshaw MD     Patient offered genetic testing- declined   Repeat US eval 5/3 - wnl         Encounter for maternal care for suspected poor fetal growth in valadez pregnancy in third trimester ([facility])    Overview Addendum 5/3/2024 10:39 AM by Bharti Crenshaw MD     S<D on   Growth US 5/3 - normal growth pattern         Anemia during pregnancy in third trimester ([facility])    Overview Addendum 5/3/2024 10:34 AM by Bharti Crenshaw MD     Hgb 9.5 , PO Fe sent . Scheduled for IV iron infusion on .         Supervision of high risk pregnancy in third trimester ([facility])    Limited prenatal care in third trimester ([facility])    Overview Signed 2024 10:54 AM by Irina Cast MD     Only 4 prenatal visits               Obstetrical History   Denies prior complications with pregnancies. Largest baby was 8#, this baby feels smaller.  OB History    Para Term  AB Living   6 3 3 0 2 3   SAB IAB Ectopic Multiple Live Births     2 0 0 3      # Outcome Date GA Lbr Gustavo/2nd Weight Sex Delivery Anes PTL Lv   6 Current            5 Term            4 Term            3 Term            2 IAB            1 IAB                Past Medical History  Past Medical History:   Diagnosis Date    31 weeks gestation of pregnancy ([facility]) 10/21/2021    31 weeks gestation of pregnancy    Encounter for  screening, unspecified ([facility]) 2021    Encounter for fetal ultrasound    Encounter for immunization 2018    Need for Tdap vaccination    Encounter for pregnancy test, result positive ([facility]) 2014    Positive pregnancy test    Encounter for supervision of normal first pregnancy, unspecified trimester ([facility]) 2015     Pregnancy, first    Personal history of diseases of the skin and subcutaneous tissue     History of eczema    Personal history of other diseases of the respiratory system 12/05/2014    History of asthma    Personal history of other drug therapy 10/21/2021    History of influenza vaccination        Past Surgical History   Past Surgical History:   Procedure Laterality Date    OTHER SURGICAL HISTORY  06/29/2021    Dilation and curettage    OTHER SURGICAL HISTORY  04/01/2015    Prior Surgical Procedure Not Done       Social History  Social History     Tobacco Use    Smoking status: Never    Smokeless tobacco: Never   Substance Use Topics    Alcohol use: Not Currently     Substance and Sexual Activity   Drug Use Never       Allergies  Patient has no known allergies.     Medications  Ferrous sulfate    Objective    Last Vitals  Temp Pulse Resp BP MAP O2 Sat   36.8 °C (98.2 °F) 85 (!) 24 (Cayden CHAVIRA aware) 119/68   99 %     Physical Examination  General: no acute distress  HEENT: normocephalic, atraumatic  Heart: warm and well perfused  Lungs: breathing comfortably on room air  Abdomen: gravid  Extremities: moving all extremities  Neuro: awake and conversant  Psych: appropriate mood and affect    Cervical Exam  Dilation: 3  Effacement (%): 50  Fetal Station: -3  Presentation: Vertex  Method: Manual  OB Examiner: Jamie CHAVIRA  Fetal Assessment  Movement: Present  Mode: External US  Baseline Fetal Heart Rate (bpm): 145 bpm  Baseline Classification: Normal  Variability: Moderate (Between 6 and 25 BPM)  Pattern: Accelerations   FHT: 150/mod/+accel/-decel  Dozier: irregular    Lab Review  Labs in chart were reviewed.

## 2024-05-25 NOTE — ANESTHESIA POSTPROCEDURE EVALUATION
Patient: Jing Menjivar    Procedure Summary       Date: 05/24/24 Room / Location:     Anesthesia Start: 2234 Anesthesia Stop: 05/25/24 0021    Procedure: Labor Analgesia Diagnosis:     Scheduled Providers:  Responsible Provider: Priscilla Gallagher MD    Anesthesia Type: epidural ASA Status: 2            Anesthesia Type: epidural    Vitals Value Taken Time   /67 05/25/24 0734   Temp 37.1 05/25/24 0734   Pulse 73 05/25/24 0734        SpO2 95 05/25/24 0734       Anesthesia Post Evaluation    Patient location during evaluation: bedside  Patient participation: complete - patient participated  Level of consciousness: awake  Pain score: 0  Pain management: adequate  Airway patency: patent  Cardiovascular status: acceptable  Respiratory status: acceptable  Hydration status: acceptable  Postoperative Nausea and Vomiting: none      No notable events documented.

## 2024-05-26 VITALS
RESPIRATION RATE: 17 BRPM | BODY MASS INDEX: 36.73 KG/M2 | OXYGEN SATURATION: 97 % | TEMPERATURE: 98.2 F | HEART RATE: 69 BPM | HEIGHT: 65 IN | WEIGHT: 220.46 LBS | DIASTOLIC BLOOD PRESSURE: 77 MMHG | SYSTOLIC BLOOD PRESSURE: 129 MMHG

## 2024-05-26 PROBLEM — O09.93 SUPERVISION OF HIGH RISK PREGNANCY IN THIRD TRIMESTER (HHS-HCC): Status: RESOLVED | Noted: 2024-05-14 | Resolved: 2024-05-26

## 2024-05-26 PROBLEM — O36.5930: Status: RESOLVED | Noted: 2024-04-26 | Resolved: 2024-05-26

## 2024-05-26 PROBLEM — R68.89 SMALL HEAD CIRCUMFERENCE: Status: RESOLVED | Noted: 2024-02-22 | Resolved: 2024-05-26

## 2024-05-26 PROCEDURE — 2500000001 HC RX 250 WO HCPCS SELF ADMINISTERED DRUGS (ALT 637 FOR MEDICARE OP): Performed by: STUDENT IN AN ORGANIZED HEALTH CARE EDUCATION/TRAINING PROGRAM

## 2024-05-26 RX ORDER — ACETAMINOPHEN 500 MG
1000 TABLET ORAL EVERY 6 HOURS PRN
Qty: 120 TABLET | Refills: 0 | Status: SHIPPED | OUTPATIENT
Start: 2024-05-26

## 2024-05-26 RX ORDER — IBUPROFEN 600 MG/1
600 TABLET ORAL EVERY 6 HOURS PRN
Qty: 120 TABLET | Refills: 0 | Status: SHIPPED | OUTPATIENT
Start: 2024-05-26 | End: 2024-06-25

## 2024-05-26 RX ORDER — DOCUSATE SODIUM 100 MG/1
100 CAPSULE, LIQUID FILLED ORAL 2 TIMES DAILY PRN
Qty: 60 CAPSULE | Refills: 0 | Status: SHIPPED | OUTPATIENT
Start: 2024-05-26 | End: 2024-06-25

## 2024-05-26 RX ADMIN — IBUPROFEN 600 MG: 600 TABLET, FILM COATED ORAL at 04:30

## 2024-05-26 RX ADMIN — ACETAMINOPHEN 975 MG: 325 TABLET ORAL at 04:30

## 2024-05-26 ASSESSMENT — PAIN SCALES - GENERAL: PAINLEVEL_OUTOF10: 0 - NO PAIN

## 2024-05-26 NOTE — PROGRESS NOTES
GABE met with Otto, mother of baby girl Otto. Also present was FOLUIS FELIPE and two siblings. MOB reports that FOLUIS FELIPE is supportive and she also has family nearby to help if needed. She has necessary baby items at home. GABE discussed post partum depression. MOB reports that she has a counselor and is resuming medication. Clear for discharge from a social work perspective.      NATY Walsh

## 2024-05-26 NOTE — DISCHARGE SUMMARY
Discharge Summary    Admission Date: 2024  Discharge Date: 24  Discharge Diagnosis:      Patient Active Problem List   Diagnosis    Class 1 obesity    37 weeks gestation of pregnancy ([facility])    Depression affecting pregnancy in first trimester, antepartum ([facility])    Moderate persistent asthma without complication ([facility])    Anemia during pregnancy in third trimester ([facility])    Limited prenatal care in third trimester ([facility])     (normal spontaneous vaginal delivery) ([facility])       Hospital Course  Jing Menjivar is a 33 y.o.,     Initially presented for: rrIOL    Admission Date: 2024    Delivery Date: 2024  12:21 AM     Delivery type: Vaginal, Spontaneous      GA at delivery: 39w1d    Outcome: Living     Anesthesia during delivery: Epidural     Intrapartum complications: None     Feeding method: Breastfeeding Status: Yes    Contraception: Post-placental IUD    Rhogam: The patient's blood type is AB POS. Rhogam is not indicated.     Now postpartum day: 1.    Hospital course n/f:  Seen and cleared by SW- pt has counselor and is resuming medication as outpt    PP course uneventful.  Meeting all postpartum milestones- ambulating independently, passing flatus, tolerating PO intake, lochia light, voiding spontaneously, and pain well controlled with PO meds. Requests DC today.     Dispo  OK for DC today per pt preference  6 week postpartum follow-up with prenatal provider.     Pertinent Physical Exam At Time of Discharge  General: well appearing, well nourished, postpartum  Obstetric: fundus firm below umbilicus, lochia light  Skin: Warm, dry; no rashes/lesions/erythema  Breast: No masses, nipple discharge  Neuro: A/Ox3, conversational, no gross motor deficit   GI: no distension, appropriately tender, soft, +BS  Respiratory: Even and unlabored on RA, LSCTA BL  Cardiovascular: Trace BLE edema; No erythema, warmth  Psych: appropriate mood and affect       Your medication  list        START taking these medications        Instructions Last Dose Given Next Dose Due   acetaminophen 500 mg tablet  Commonly known as: Tylenol      Take 2 tablets (1,000 mg) by mouth every 6 hours if needed for moderate pain (4 - 6).       docusate sodium 100 mg capsule  Commonly known as: Colace      Take 1 capsule (100 mg) by mouth 2 times a day as needed for constipation.       ibuprofen 600 mg tablet      Take 1 tablet (600 mg) by mouth every 6 hours if needed for moderate pain (4 - 6) (pain).              CONTINUE taking these medications        Instructions Last Dose Given Next Dose Due   FeroSuL tablet  Generic drug: ferrous sulfate (325 mg ferrous sulfate)      Take 1 tablet by mouth once daily with breakfast.       ferrous gluconate 324 (38 Fe) mg tablet  Commonly known as: Fergon      Take 1 tablet (38 mg of iron) by mouth once daily with breakfast.       loratadine 10 mg tablet  Commonly known as: Claritin      Take 1 tablet (10 mg) by mouth once daily.       sertraline 25 mg tablet  Commonly known as: Zoloft      Take 2 tablets (50 mg) by mouth once daily.       Unisom (doxylamine) 25 mg tablet  Generic drug: doxylamine      Take 1 tablet (25 mg) by mouth as needed at bedtime for sleep.                 Where to Get Your Medications        These medications were sent to [facility]/pharmacy #[id number] - [place], [place] - [address] [address]  [address] [address] [address]      Hours: 24-hours Phone: [phone]   acetaminophen 500 mg tablet  docusate sodium 100 mg capsule  ibuprofen 600 mg tablet           Outpatient Follow-Up  Future Appointments   Date Time Provider Department Center   6/3/2024 10:00 AM Teofilo Marroquin, PT [id number]       Ofe Walker, APRN-GINA

## 2024-05-26 NOTE — LACTATION NOTE
Lactation Consultant Note  Lactation Consultation  Reason for Consult: Follow-up assessment  Consultant Name: Monisha Bhakta RN, IBCLC    Maternal Information  Has mother  before?: Yes  Infant to breast within first 2 hours of birth?: Yes  Exclusive Pump and Bottle Feed: No    Maternal Assessment  Breast Assessment: Medium, Soft, Compressible  Nipple Assessment: Intact, Bruised, Large diameter, Erect, Sore  Alterations in Nipple Condition: Stage I - pain or irritation with no skin break down  Areola Assessment: Normal    Infant Assessment  Infant Behavior: Light sleep, Feeding cues observed, Rooting response  Infant Assessment: Good cupping of tongue    Feeding Assessment  Nutrition Source: Breastmilk  Feeding Method: Nursing at the breast  Feeding Position: Breast sandwich, Skin to skin, Nipple to nose, One sided nursing, Mother needs assistance with latch/positioning  Suck/Feeding: Sustained, Audible swallowing, Content after feeding  Latch Assessment: Instructed on deep latch, Pain during feeding, Eagerly grasped on to latch, Areolar attachment, Mouth not open wide enough, Chin moves in rhythmic motion, Correct tongue position, Comfortable latch, Flanged lips    LATCH TOOL  Latch: Grasps breast, tongue down, lips flanged, rhythmic sucking  Audible Swallowing: Spontaneous and intermittent (24 hours old)  Type of Nipple: Everted (After stimulation)  Comfort (Breast/Nipple): Filling, red/small blisters/bruises, mild/moderate discomfort  Hold (Positioning): Minimal assist, teach one side, mother does other, staff holds  LATCH Score: 8    Breast Pump       Other OB Lactation Tools       Patient Follow-up  Outpatient Lactation Follow-up: Recommended    Other OB Lactation Documentation       Recommendations/Summary  Mom c/o sore nipples and baby clusterfeeding overnight.  Mom has experience with breast feeding (2+years) and of pumping (1+years)  with previous children.   She does not want to exclusively pump, as  she did not enjoy that with her older child, but her older child(3yo)  has some oral issues and was never able to latch for her like her older child had.        She reports that this baby is very eager to latch, but she doesn't open her mouth very wide and therefore mom's nipples are very sore. Mom's nipples are larger in diameter and she expresses colostrum easily.  Her breasts are very compressible.     We worked together to get baby latched in football hold, skin to skin,  on mom's left breast.  We had to guide baby on, using gentle pressure to get baby's mouth to open wider to accommodate mom's larger nipple. The first 10-15 seconds were painful for mom, but once baby was latched onto mom's areola and rhythmically sucking, mom reported a comfortable latch with no pain.      Baby had fed one hour earlier and the feeding observed lasted about 15mins, coming off once to burp and latching back on with the same result of a comfortable latch. Mom was able to return demonstrate how to get baby latched deeply, but is encouraged to call for more assistance if needed to ensure a comfortable latch.       Mom has a pump for home use if needed, and outpatient lactation info was given.

## 2024-05-26 NOTE — PROGRESS NOTES
Postpartum Progress Note      Assessment/Plan     Jing Menjivar is a 33 y.o.,  initially presented for rrIOL  She had a Vaginal, Spontaneous   delivery on 2024  at 39w1d and is now postpartum day 1.    Pregnancy notable for:  - moderate persistent asthma, no inhlaer use currently  - no 1 hr gtt, A1c 6/3 5.8%, Last growth US: 36.0 5/3 2583g 27% AC 52%  - history of depression with history of worsening in post partum period- no current mood issues  - anemia, on PO Iron, admission Hgb  9.8    #CARINA   - Hg 9.8 on admission,   - Cont PO Fe    #Postpartum  - continue routine postpartum care  - pain well controlled on po medications  - DVT Score: 5 ; SCDs and enoxaparin prophylactic dose daily while inpatient  - The patient's blood type is AB POS. Rhogam is not indicated.    #Maternal Well-Being  - emotional support provided  - bonding with infant, mood OK, SW consult  - Contraception: Post-placental IUD    #[place] Feeding  - breastfeeding/pumping encouraged; lactation consult prn    #Dispo  - anticipate d/c on PPD #2 if continuing to meet all postpartum milestones  - for f/u 4-6 weeks with Primary OB provider      Active Problems:    Depression affecting pregnancy in first trimester, antepartum ([facility])    Moderate persistent asthma without complication ([facility])    Anemia during pregnancy in third trimester ([facility])     (normal spontaneous vaginal delivery) ([facility])    Subjective   Pt seen with baby on chest. Pt states she's doing OK mood wise. Feels like these cramps are the worst of her previous births, especially while breastfeeding.    Meeting all postpartum milestones- ambulating independently, passing flatus, tolerating PO intake, lochia light, voiding spontaneously, and pain well controlled with PO meds.    Objective   Physical Exam:  General: well appearing, well nourished, postpartum  Obstetric: fundus firm below umbilicus, lochia light  Skin: Warm, dry; no  rashes/lesions/erythema  Breast: No masses, nipple discharge  Neuro: A/Ox3, no gross motor deficit   GI: no distension, appropriately tender, soft, +BS  Respiratory: Even and unlabored on RA, LSCTA BL  Cardiovascular: No BLE edema; No erythema, warmth  Psych: appropriate mood and affect, conversational, interactive    Last Vitals:  Temp Pulse Resp BP MAP Pulse Ox   36.8 °C (98.2 °F) 69 17 129/77   97 %       Vitals Min/Max Last 24 Hours:  Temp  Min: 36.2 °C (97.2 °F)  Max: 37 °C (98.6 °F)  Pulse  Min: 65  Max: 75  Resp  Min: 17  Max: 18  BP  Min: 110/70  Max: 133/82    Lab Data:  Lab Results   Component Value Date    WBC 13.5 (H) 05/24/2024    HGB 9.8 (L) 05/24/2024    HCT 31.2 (L) 05/24/2024     05/24/2024

## 2024-05-27 LAB
BLOOD EXPIRATION DATE: NORMAL
DISPENSE STATUS: NORMAL
PRODUCT BLOOD TYPE: 8400
PRODUCT CODE: NORMAL
UNIT ABO: NORMAL
UNIT NUMBER: NORMAL
UNIT RH: NORMAL
UNIT VOLUME: 350
XM INTEP: NORMAL

## 2024-06-03 ENCOUNTER — EVALUATION (OUTPATIENT)
Dept: PHYSICAL THERAPY | Facility: CLINIC | Age: 34
End: 2024-06-03
Payer: COMMERCIAL

## 2024-06-03 DIAGNOSIS — R10.2 PELVIC PAIN: Primary | ICD-10-CM

## 2024-06-03 PROCEDURE — 97161 PT EVAL LOW COMPLEX 20 MIN: CPT | Mod: GP | Performed by: PHYSICAL THERAPIST

## 2024-06-03 PROCEDURE — 97110 THERAPEUTIC EXERCISES: CPT | Mod: GP | Performed by: PHYSICAL THERAPIST

## 2024-06-03 NOTE — PROGRESS NOTES
Physical Therapy    PELVIC FLOOR EVALUATION AND TREATMENT    Name: Jing Menjivar  MRN: [id number]  : 1990  Today's Date: 24     Time Calculation  Start Time: 1010  Stop Time: 1100  Time Calculation (min): 50 min    PT Evaluation Time Entry  PT Evaluation (Low) Time Entry: 30  PT Therapeutic Procedures Time Entry  Therapeutic Exercise Time Entry: 10       Visit: 1    Assessment:   Pt presents to clinic with chief complaint of anterior pelvic pain and stress UI that began during her pregnancy. Pt is now 1 week PP with her daughter. Her pain has decreased, however, she continues to have difficulty with transfers, walking, and lifting. Pain is anterior and seems to be centralized around the pubic symphysis. She did suffer a fall during her pregnancy which she states was when her pain began.   She demonstrates reduced hip mobility bilat, moderate postural deficits, and reduced strength in her core and hip musculature  Pt will benefit from skilled therapy to address current impairments for reduced pain and improved bladder control         Plan:   Treatment/Interventions: Cryotherapy, Education/ Instruction, Electrical stimulation, Gait training, Hot pack, Manual therapy, Neuromuscular re-education, Self care/ home management, Therapeutic activities, Therapeutic exercises  PT Plan: Skilled PT  PT Frequency: 1 time per week  Duration: 6-8 weeks  Rehab Potential: Good  Plan of Care Agreement: Patient      Current Problem:  1. Pelvic pain  Referral to Physical Therapy    Follow Up In Physical Therapy      2. Postpartum care and examination ([facility])  Follow Up In Physical Therapy          Subjective   General  Reason for Referral: Pelvic pain, postpartum  Referred By: Dr. Carie Love  Preferred Learning Style: verbal, visual, written  Precautions  STEADI Fall Risk Score (The score of 4 or more indicates an increased risk of falling): 2  Precautions Comment: None         Objective   PELVIC HISTORY:    Chief  Complaint/Description of Symptoms:   Pt presents to clinic with chief complaint of anterior pelvic pain that began during her pregnancy  Pt also endorses stress incontinence       HPI:   Pt states pelvic pain began during her pregnancy and progressively worsened into her third trimester. Pt suffered a fall during her pregnancy; she had a syncopal episode and it was shortly after the fall that she began to have pain. She states she had difficulty with walking, transfers, lifting, and any other daily activities   Pt is now 1 week PP with her daughter and she does feel like pain symptoms have improved; however, she still has difficulty with transfers, walking, lifting, and bed mobility. Pain seems to be localized to the pubic symphysis and is worse with R LE motion (single leg)   She is still taking tylenol and ibuprofen for pain management    Pt also reports stress incontinence that began during her pregnancy; most notable with coughing or laughing       Home Environment/Social Factors/Occupation:     Vaginal deliveries with her children  Pt most recent delivery; no tearing; pt had a baby girl     Pt about 1 week PP       PELVIC PAIN:     Description: grinding, sharp pain  Location: pubic symphysis; anterior pelvis   Aggravators: bending, lifting, motion, SL motion (more R LE vs L)  Frequency: intermittent  No pain with sitting/resting     Since onset, the symptoms are: Improving   Pain when emptying bladder: No   Pain with wiping or tight clothing: No  Pain with intercourse: Cramping type pain afterward; no pain during; cramping in lower abdomen  History of back pain:   LBP during pregnancy  Still notable since giving birth     EXERCISE:  Do you do Kegels? No    Current exercise regime: Not at this time     BLADDER:     Excessive Urinary Urgency: Sometimes   Daytime Voiding Frequency: WNL  Nighttime Voiding Frequency: Yes; 1-2x/night   Unintentional urine loss frequency: Intermittent; with certain activities    Leakage occurs with: Coughing, laughing   Leakage amount: Moderate to large amount   Difficulty initiating flow of urine: No   Slow/weak urine stream: No   Difficulty starting urine stream/push to urinate: Initially, yes; but it's improved a little   Able to completely empty bladder: Yes   Tests performed by doctor: No; has not had 6 week follow up yet   Frequent UTI's: No     BOWEL:     Excessive Bowel Urgency: No  BM Frequency: Daily  Frequent Diarrhea: No  Frequent constipation/straining/incomplete emptying: No significant constipation, straining; does feel like she is able to empty more completely   [place] Stool Scale rating: either type 1 or type 5-6   Unintentional stool loss: No       GOALS FOR PFPT:  Decrease pain  Decrease urinary leakage       POSTURE:   Increased lumbar lordosis in standing  Pelvic crest height symmetric  No LLD noted     Significant tenderness to palpation pubic symphysis       MMT R/L:  Hip flex: 3+/4+; pelvic pain R  Hip add: 4+/5; mild pain R  Hip abd: 4+/4+  Hip IR: 4+/4+  Hip ER: 3+/4+' pelvic pain R     Quadriceps: 5/5  Hamstrings: 4+/4+    PROM  R/L     Hip flexion: 90 / 90 deg; empty end feel bilat d/t pain   Hip abd: WNL bilat   Hip ER: WNL / mild limitation   Hip IR: moderate limitation bilat; pelvic pain    Pain with AROM hip flexion bilat at pubic symphysis       OUTCOMES MEASURE:  Female NIH Chronic Prostatitis Symptom index: 17    Education:  PFPT  Potential cause of symptoms  PF anatomy  Role of PFPT and goals  Assessment of pelvic floor       TREATMENT  Therapeutic exercise:  Diaphragmatic breathing x 10   DB with TA during exhale x 10   PPT x 10       Careplan Goals:  Problem: PT Problem       Goal: Pt will report 50% reduction in pelvic pain for improved ability to stand and walk for longer periods of time           Goal: Pt will demonstrate full and pain-free hip AROM into flexion and ER/IR for improved ability to perform transfers and self-care activities            Goal: Pt will increase LE strength by 1 MMT grade for all weakened muscle groups for improved pelvic and core stability           Goal: Pt will demonstrate appropriate TA activation for improved core and pelvic stability for reduced pain           Goal: Pt will be independent in HEP for home maintenance            Teofilo Marroquin, PT

## 2024-06-04 ASSESSMENT — ENCOUNTER SYMPTOMS
DEPRESSION: 0
LOSS OF SENSATION IN FEET: 0
OCCASIONAL FEELINGS OF UNSTEADINESS: 0

## 2024-07-08 ENCOUNTER — DOCUMENTATION (OUTPATIENT)
Dept: PHYSICAL THERAPY | Facility: CLINIC | Age: 34
End: 2024-07-08
Payer: COMMERCIAL

## 2024-07-08 NOTE — PROGRESS NOTES
Physical Therapy                 Therapy Communication Note    Patient Name: Jing Menjivar  MRN: [id number]  Today's Date: 7/8/2024     Discipline: Physical Therapy    Missed Visit Reason:      Missed Time: No Show    Comment: PFPT treat

## 2024-08-19 ENCOUNTER — APPOINTMENT (OUTPATIENT)
Dept: OBSTETRICS AND GYNECOLOGY | Facility: CLINIC | Age: 34
End: 2024-08-19
Payer: COMMERCIAL

## 2024-08-19 VITALS — BODY MASS INDEX: 34.28 KG/M2 | SYSTOLIC BLOOD PRESSURE: 128 MMHG | DIASTOLIC BLOOD PRESSURE: 60 MMHG | WEIGHT: 206 LBS

## 2024-08-19 ASSESSMENT — EDINBURGH POSTNATAL DEPRESSION SCALE (EPDS)
I HAVE LOOKED FORWARD WITH ENJOYMENT TO THINGS: AS MUCH AS I EVER DID
I HAVE BEEN ABLE TO LAUGH AND SEE THE FUNNY SIDE OF THINGS: AS MUCH AS I ALWAYS COULD
I HAVE BEEN SO UNHAPPY THAT I HAVE BEEN CRYING: NO, NEVER
I HAVE FELT SAD OR MISERABLE: NO, NOT AT ALL
I HAVE FELT SCARED OR PANICKY FOR NO GOOD REASON: NO, NOT AT ALL
I HAVE BEEN SO UNHAPPY THAT I HAVE HAD DIFFICULTY SLEEPING: NOT AT ALL
THINGS HAVE BEEN GETTING ON TOP OF ME: NO, I HAVE BEEN COPING AS WELL AS EVER
I HAVE BEEN ANXIOUS OR WORRIED FOR NO GOOD REASON: NO, NOT AT ALL
TOTAL SCORE: 0
THE THOUGHT OF HARMING MYSELF HAS OCCURRED TO ME: NEVER
I HAVE BLAMED MYSELF UNNECESSARILY WHEN THINGS WENT WRONG: NO, NEVER

## 2024-08-19 ASSESSMENT — ENCOUNTER SYMPTOMS
PSYCHIATRIC NEGATIVE: 0
NEUROLOGICAL NEGATIVE: 0
RESPIRATORY NEGATIVE: 0
CONSTITUTIONAL NEGATIVE: 0
MUSCULOSKELETAL NEGATIVE: 0
ALLERGIC/IMMUNOLOGIC NEGATIVE: 0
ENDOCRINE NEGATIVE: 0
GASTROINTESTINAL NEGATIVE: 0
EYES NEGATIVE: 0
CARDIOVASCULAR NEGATIVE: 0
HEMATOLOGIC/LYMPHATIC NEGATIVE: 0

## 2024-08-19 NOTE — PROGRESS NOTES
Plan    Advised to call office for breast complaints, abnormal bleeding, mood changes, or other concerning symptoms.   Cleared to resume normal activity as desired  Patient first verbalized consent for malpositions IUD removal, written consent obtained post-procedure. IUD removed with no complications, patient tolerated procedure well. We discussed follow up birth control. Patient to consider and return to care.     Diagnoses and all orders for this visit:  Routine postpartum follow-up ([facility])  -     Referral to Lactation; Future  Other orders  -     IUD Removal    Encouraged to reach out to our office with any questions or concerns.   Encouraged patient to follow up annually or PRN    Lillie Frank, APRN-CNM    Subjective   33 y.o.  presenting for postpartum follow-up     Delivery Date: 24  Gestational Age: 39 weeks    Problem List       No episode was linked to this visit.            Concerns: patient reports concerns for her post placental IUD. See below.     Pain: controlled  Lacerations: none  Lochia: bleeding been dgood, slowed doen a lot but then bright red. Now pin kbleeding.   Sexual Intimacy: Yes  Contraceptive Method: patient had post placental IUD. Patient reports that her partner is reporting feeling somehting and that she has felt a poking sensation in the vagina starting in the beginning of July.   Feeding Method: She is breast feeding exclusively.  Patient reports dry nipples diffuclt in latching and nipple dryness. Patint repots good dipers and weight gain for baby.   Lactation Consult Needed?: Yes    Birth Trauma: No  Bonding with Baby: well with baby girl, Brai'la  Mood:   Postpartum Depression: Low Risk  (2024)    [place]  Depression Scale     Last EPDS Total Score: 0     Last EPDS Self Harm Result: Never       Last pap: , due   Objective    /60   Wt 93.4 kg (206 lb)   Breastfeeding Yes   BMI 34.28 kg/m²      General:   Alert and oriented x 3    Heart:  Lungs: Regular rate, rhythm  Clear to auscultation bilaterally   Thyroid: Euthyroid, normal shape and size   Breast: Symmetrical, no skin changes/nipple discharge, redness, tenderness, no masses palpated bilaterally   Abdomen: Soft, non tender   Vulva: EGBUS normal   Vagina: Pink, normal discharge   Cervix: No CMT. IUD tip and sting are visible at the cervical os. See picture   Uterus: Normal shape, size   Adnexa: NT bilaterally       Physical Exam  Genitourinary:           IUD Removal    Performed by: MAGNOLIA Wei  Authorized by: MAGNOLIA Wei    Procedure: IUD removal    Reason for removal: malposition    Strings visualized: yes    Cervix cleaned with: chlorhexidine    Tenaculum applied to cervix: no    IUD grasped by forceps: yes    Performed with ultrasound guidance: no    IUD removed: yes    Date/Time of Removal:  8/19/2024 12:13 PM  Removed without complications: yes    IUD intact: yes    Cervix manually dilated: no

## 2024-10-01 ENCOUNTER — APPOINTMENT (OUTPATIENT)
Dept: PODIATRY | Facility: CLINIC | Age: 34
End: 2024-10-01
Payer: COMMERCIAL

## 2024-11-20 ENCOUNTER — DOCUMENTATION (OUTPATIENT)
Dept: PHYSICAL THERAPY | Facility: CLINIC | Age: 34
End: 2024-11-20
Payer: COMMERCIAL

## 2024-11-20 NOTE — PROGRESS NOTES
Physical Therapy    Discharge Summary    Name: Jing Menjivar  MRN: [id number]  : 1990  Date: 24    Discharge Summary: PT    Discharge Information: Date of last visit 24    Therapy Summary: pt did not follow up    Discharge Status: discharge to [facility]     Rehab Discharge Reason: Failed to schedule and/or keep follow-up appointment(s)